# Patient Record
Sex: MALE | Race: WHITE | Employment: PART TIME | URBAN - METROPOLITAN AREA
[De-identification: names, ages, dates, MRNs, and addresses within clinical notes are randomized per-mention and may not be internally consistent; named-entity substitution may affect disease eponyms.]

---

## 2020-01-14 ENCOUNTER — HOSPITAL ENCOUNTER (OUTPATIENT)
Dept: PREADMISSION TESTING | Age: 65
Discharge: HOME | End: 2020-01-14
Payer: COMMERCIAL

## 2020-01-14 DIAGNOSIS — M20.42 HAMMER TOE OF LEFT FOOT: ICD-10-CM

## 2020-01-14 LAB
ALBUMIN SERPL-MCNC: 3.9 G/DL (ref 3.4–5)
ALBUMIN/GLOB SERPL: 1.3 {RATIO} (ref 0.8–1.7)
ALP SERPL-CCNC: 53 U/L (ref 45–117)
ALT SERPL-CCNC: 34 U/L (ref 16–61)
ANION GAP SERPL CALC-SCNC: 5 MMOL/L (ref 3–18)
AST SERPL-CCNC: 19 U/L (ref 10–38)
BILIRUB SERPL-MCNC: 1 MG/DL (ref 0.2–1)
BUN SERPL-MCNC: 13 MG/DL (ref 7–18)
BUN/CREAT SERPL: 14 (ref 12–20)
CALCIUM SERPL-MCNC: 9.2 MG/DL (ref 8.5–10.1)
CHLORIDE SERPL-SCNC: 107 MMOL/L (ref 100–111)
CO2 SERPL-SCNC: 28 MMOL/L (ref 21–32)
CREAT SERPL-MCNC: 0.93 MG/DL (ref 0.6–1.3)
ERYTHROCYTE [DISTWIDTH] IN BLOOD BY AUTOMATED COUNT: 12.8 % (ref 11.6–14.5)
GLOBULIN SER CALC-MCNC: 3 G/DL (ref 2–4)
GLUCOSE SERPL-MCNC: 95 MG/DL (ref 74–99)
HCT VFR BLD AUTO: 43.5 % (ref 36–48)
HGB BLD-MCNC: 14.3 G/DL (ref 13–16)
MCH RBC QN AUTO: 30.7 PG (ref 24–34)
MCHC RBC AUTO-ENTMCNC: 32.9 G/DL (ref 31–37)
MCV RBC AUTO: 93.3 FL (ref 74–97)
PLATELET # BLD AUTO: 246 K/UL (ref 135–420)
PMV BLD AUTO: 10.2 FL (ref 9.2–11.8)
POTASSIUM SERPL-SCNC: 4.4 MMOL/L (ref 3.5–5.5)
PROT SERPL-MCNC: 6.9 G/DL (ref 6.4–8.2)
RBC # BLD AUTO: 4.66 M/UL (ref 4.7–5.5)
SODIUM SERPL-SCNC: 140 MMOL/L (ref 136–145)
WBC # BLD AUTO: 5.8 K/UL (ref 4.6–13.2)

## 2020-01-14 PROCEDURE — 85027 COMPLETE CBC AUTOMATED: CPT

## 2020-01-14 PROCEDURE — 36415 COLL VENOUS BLD VENIPUNCTURE: CPT

## 2020-01-14 PROCEDURE — 80053 COMPREHEN METABOLIC PANEL: CPT

## 2020-02-07 ENCOUNTER — ANESTHESIA (OUTPATIENT)
Dept: SURGERY | Age: 65
End: 2020-02-07
Payer: COMMERCIAL

## 2020-02-07 ENCOUNTER — ANESTHESIA EVENT (OUTPATIENT)
Dept: SURGERY | Age: 65
End: 2020-02-07
Payer: COMMERCIAL

## 2020-02-07 ENCOUNTER — HOSPITAL ENCOUNTER (OUTPATIENT)
Age: 65
Setting detail: OUTPATIENT SURGERY
Discharge: HOME | End: 2020-02-07
Attending: ORTHOPAEDIC SURGERY | Admitting: ORTHOPAEDIC SURGERY
Payer: COMMERCIAL

## 2020-02-07 VITALS
HEIGHT: 71 IN | DIASTOLIC BLOOD PRESSURE: 77 MMHG | BODY MASS INDEX: 26.52 KG/M2 | RESPIRATION RATE: 14 BRPM | TEMPERATURE: 97 F | OXYGEN SATURATION: 99 % | SYSTOLIC BLOOD PRESSURE: 122 MMHG | HEART RATE: 62 BPM | WEIGHT: 189.44 LBS

## 2020-02-07 DIAGNOSIS — M20.5X1 CLAW TOE, ACQUIRED, RIGHT: Primary | ICD-10-CM

## 2020-02-07 PROCEDURE — 77030012508 HC MSK AIRWY LMA AMBU -A: Performed by: ANESTHESIOLOGY

## 2020-02-07 PROCEDURE — 77030002933 HC SUT MCRYL J&J -A: Performed by: ORTHOPAEDIC SURGERY

## 2020-02-07 PROCEDURE — 77030031140 HC SUT VCRL3 J&J -A: Performed by: ORTHOPAEDIC SURGERY

## 2020-02-07 PROCEDURE — 74011000250 HC RX REV CODE- 250: Performed by: ANESTHESIOLOGY

## 2020-02-07 PROCEDURE — 77030040361 HC SLV COMPR DVT MDII -B: Performed by: ORTHOPAEDIC SURGERY

## 2020-02-07 PROCEDURE — 74011250636 HC RX REV CODE- 250/636: Performed by: ORTHOPAEDIC SURGERY

## 2020-02-07 PROCEDURE — 77030002916 HC SUT ETHLN J&J -A: Performed by: ORTHOPAEDIC SURGERY

## 2020-02-07 PROCEDURE — 77030031139 HC SUT VCRL2 J&J -A: Performed by: ORTHOPAEDIC SURGERY

## 2020-02-07 PROCEDURE — 76010000138 HC OR TIME 0.5 TO 1 HR: Performed by: ORTHOPAEDIC SURGERY

## 2020-02-07 PROCEDURE — 77030000032 HC CUF TRNQT ZIMM -B: Performed by: ORTHOPAEDIC SURGERY

## 2020-02-07 PROCEDURE — 77030020782 HC GWN BAIR PAWS FLX 3M -B: Performed by: ORTHOPAEDIC SURGERY

## 2020-02-07 PROCEDURE — 74011000250 HC RX REV CODE- 250: Performed by: ORTHOPAEDIC SURGERY

## 2020-02-07 PROCEDURE — 76210000006 HC OR PH I REC 0.5 TO 1 HR: Performed by: ORTHOPAEDIC SURGERY

## 2020-02-07 PROCEDURE — 76210000020 HC REC RM PH II FIRST 0.5 HR: Performed by: ORTHOPAEDIC SURGERY

## 2020-02-07 PROCEDURE — 74011250636 HC RX REV CODE- 250/636: Performed by: ANESTHESIOLOGY

## 2020-02-07 PROCEDURE — 76060000032 HC ANESTHESIA 0.5 TO 1 HR: Performed by: ORTHOPAEDIC SURGERY

## 2020-02-07 RX ORDER — NALOXONE HYDROCHLORIDE 0.4 MG/ML
0.1 INJECTION, SOLUTION INTRAMUSCULAR; INTRAVENOUS; SUBCUTANEOUS AS NEEDED
Status: DISCONTINUED | OUTPATIENT
Start: 2020-02-07 | End: 2020-02-10 | Stop reason: HOSPADM

## 2020-02-07 RX ORDER — PROPOFOL 10 MG/ML
INJECTION, EMULSION INTRAVENOUS AS NEEDED
Status: DISCONTINUED | OUTPATIENT
Start: 2020-02-07 | End: 2020-02-07 | Stop reason: HOSPADM

## 2020-02-07 RX ORDER — INSULIN LISPRO 100 [IU]/ML
INJECTION, SOLUTION INTRAVENOUS; SUBCUTANEOUS ONCE
Status: DISCONTINUED | OUTPATIENT
Start: 2020-02-07 | End: 2020-02-08 | Stop reason: HOSPADM

## 2020-02-07 RX ORDER — LIDOCAINE HYDROCHLORIDE 20 MG/ML
INJECTION, SOLUTION EPIDURAL; INFILTRATION; INTRACAUDAL; PERINEURAL AS NEEDED
Status: DISCONTINUED | OUTPATIENT
Start: 2020-02-07 | End: 2020-02-07 | Stop reason: HOSPADM

## 2020-02-07 RX ORDER — BUPIVACAINE HYDROCHLORIDE 5 MG/ML
INJECTION, SOLUTION EPIDURAL; INTRACAUDAL AS NEEDED
Status: DISCONTINUED | OUTPATIENT
Start: 2020-02-07 | End: 2020-02-07 | Stop reason: HOSPADM

## 2020-02-07 RX ORDER — DEXAMETHASONE SODIUM PHOSPHATE 4 MG/ML
INJECTION, SOLUTION INTRA-ARTICULAR; INTRALESIONAL; INTRAMUSCULAR; INTRAVENOUS; SOFT TISSUE AS NEEDED
Status: DISCONTINUED | OUTPATIENT
Start: 2020-02-07 | End: 2020-02-07 | Stop reason: HOSPADM

## 2020-02-07 RX ORDER — HYDROMORPHONE HYDROCHLORIDE 1 MG/ML
0.2 INJECTION, SOLUTION INTRAMUSCULAR; INTRAVENOUS; SUBCUTANEOUS AS NEEDED
Status: DISCONTINUED | OUTPATIENT
Start: 2020-02-07 | End: 2020-02-10 | Stop reason: HOSPADM

## 2020-02-07 RX ORDER — ALBUTEROL SULFATE 0.83 MG/ML
2.5 SOLUTION RESPIRATORY (INHALATION) AS NEEDED
Status: DISCONTINUED | OUTPATIENT
Start: 2020-02-07 | End: 2020-02-10 | Stop reason: HOSPADM

## 2020-02-07 RX ORDER — ONDANSETRON 2 MG/ML
4 INJECTION INTRAMUSCULAR; INTRAVENOUS ONCE
Status: DISCONTINUED | OUTPATIENT
Start: 2020-02-07 | End: 2020-02-08 | Stop reason: HOSPADM

## 2020-02-07 RX ORDER — SODIUM CHLORIDE, SODIUM LACTATE, POTASSIUM CHLORIDE, CALCIUM CHLORIDE 600; 310; 30; 20 MG/100ML; MG/100ML; MG/100ML; MG/100ML
150 INJECTION, SOLUTION INTRAVENOUS CONTINUOUS
Status: DISCONTINUED | OUTPATIENT
Start: 2020-02-07 | End: 2020-02-10 | Stop reason: HOSPADM

## 2020-02-07 RX ORDER — DEXTROSE MONOHYDRATE 100 MG/ML
125-250 INJECTION, SOLUTION INTRAVENOUS AS NEEDED
Status: DISCONTINUED | OUTPATIENT
Start: 2020-02-07 | End: 2020-02-10 | Stop reason: HOSPADM

## 2020-02-07 RX ORDER — CEFAZOLIN SODIUM 2 G/50ML
2 SOLUTION INTRAVENOUS ONCE
Status: COMPLETED | OUTPATIENT
Start: 2020-02-07 | End: 2020-02-07

## 2020-02-07 RX ORDER — MIDAZOLAM HYDROCHLORIDE 1 MG/ML
INJECTION, SOLUTION INTRAMUSCULAR; INTRAVENOUS AS NEEDED
Status: DISCONTINUED | OUTPATIENT
Start: 2020-02-07 | End: 2020-02-07 | Stop reason: HOSPADM

## 2020-02-07 RX ORDER — KETOROLAC TROMETHAMINE 15 MG/ML
INJECTION, SOLUTION INTRAMUSCULAR; INTRAVENOUS AS NEEDED
Status: DISCONTINUED | OUTPATIENT
Start: 2020-02-07 | End: 2020-02-07 | Stop reason: HOSPADM

## 2020-02-07 RX ORDER — MAGNESIUM SULFATE 100 %
4 CRYSTALS MISCELLANEOUS AS NEEDED
Status: DISCONTINUED | OUTPATIENT
Start: 2020-02-07 | End: 2020-02-10 | Stop reason: HOSPADM

## 2020-02-07 RX ORDER — FENTANYL CITRATE 50 UG/ML
25 INJECTION, SOLUTION INTRAMUSCULAR; INTRAVENOUS
Status: DISCONTINUED | OUTPATIENT
Start: 2020-02-07 | End: 2020-02-10 | Stop reason: HOSPADM

## 2020-02-07 RX ORDER — FENTANYL CITRATE 50 UG/ML
INJECTION, SOLUTION INTRAMUSCULAR; INTRAVENOUS AS NEEDED
Status: DISCONTINUED | OUTPATIENT
Start: 2020-02-07 | End: 2020-02-07 | Stop reason: HOSPADM

## 2020-02-07 RX ORDER — SODIUM CHLORIDE, SODIUM LACTATE, POTASSIUM CHLORIDE, CALCIUM CHLORIDE 600; 310; 30; 20 MG/100ML; MG/100ML; MG/100ML; MG/100ML
125 INJECTION, SOLUTION INTRAVENOUS CONTINUOUS
Status: DISCONTINUED | OUTPATIENT
Start: 2020-02-07 | End: 2020-02-10 | Stop reason: HOSPADM

## 2020-02-07 RX ORDER — SODIUM CHLORIDE 0.9 % (FLUSH) 0.9 %
5-40 SYRINGE (ML) INJECTION AS NEEDED
Status: DISCONTINUED | OUTPATIENT
Start: 2020-02-07 | End: 2020-02-10 | Stop reason: HOSPADM

## 2020-02-07 RX ORDER — HYDROCODONE BITARTRATE AND ACETAMINOPHEN 5; 325 MG/1; MG/1
1 TABLET ORAL AS NEEDED
Status: DISCONTINUED | OUTPATIENT
Start: 2020-02-07 | End: 2020-02-10 | Stop reason: HOSPADM

## 2020-02-07 RX ORDER — SODIUM CHLORIDE 0.9 % (FLUSH) 0.9 %
5-40 SYRINGE (ML) INJECTION EVERY 8 HOURS
Status: DISCONTINUED | OUTPATIENT
Start: 2020-02-07 | End: 2020-02-10 | Stop reason: HOSPADM

## 2020-02-07 RX ORDER — ONDANSETRON 2 MG/ML
INJECTION INTRAMUSCULAR; INTRAVENOUS AS NEEDED
Status: DISCONTINUED | OUTPATIENT
Start: 2020-02-07 | End: 2020-02-07 | Stop reason: HOSPADM

## 2020-02-07 RX ORDER — DIPHENHYDRAMINE HYDROCHLORIDE 50 MG/ML
12.5 INJECTION, SOLUTION INTRAMUSCULAR; INTRAVENOUS
Status: DISCONTINUED | OUTPATIENT
Start: 2020-02-07 | End: 2020-02-10 | Stop reason: HOSPADM

## 2020-02-07 RX ADMIN — MIDAZOLAM 2 MG: 1 INJECTION INTRAMUSCULAR; INTRAVENOUS at 13:35

## 2020-02-07 RX ADMIN — DEXAMETHASONE SODIUM PHOSPHATE 4 MG: 4 INJECTION, SOLUTION INTRAMUSCULAR; INTRAVENOUS at 14:05

## 2020-02-07 RX ADMIN — LIDOCAINE HYDROCHLORIDE 80 MG: 20 INJECTION, SOLUTION EPIDURAL; INFILTRATION; INTRACAUDAL; PERINEURAL at 13:41

## 2020-02-07 RX ADMIN — ONDANSETRON HYDROCHLORIDE 4 MG: 2 INJECTION INTRAMUSCULAR; INTRAVENOUS at 14:05

## 2020-02-07 RX ADMIN — FENTANYL CITRATE 50 MCG: 50 INJECTION, SOLUTION INTRAMUSCULAR; INTRAVENOUS at 13:40

## 2020-02-07 RX ADMIN — FENTANYL CITRATE 50 MCG: 50 INJECTION, SOLUTION INTRAMUSCULAR; INTRAVENOUS at 14:04

## 2020-02-07 RX ADMIN — PROPOFOL 180 MG: 10 INJECTION, EMULSION INTRAVENOUS at 13:41

## 2020-02-07 RX ADMIN — KETOROLAC TROMETHAMINE 30 MG: 15 INJECTION, SOLUTION INTRAMUSCULAR; INTRAVENOUS at 14:05

## 2020-02-07 RX ADMIN — SODIUM CHLORIDE, SODIUM LACTATE, POTASSIUM CHLORIDE, AND CALCIUM CHLORIDE 125 ML/HR: 600; 310; 30; 20 INJECTION, SOLUTION INTRAVENOUS at 13:30

## 2020-02-07 RX ADMIN — CEFAZOLIN SODIUM 2 G: 2 SOLUTION INTRAVENOUS at 13:38

## 2020-02-07 NOTE — PERIOP NOTES
Intake/Output Summary (Last 24 hours) at 2/7/2020 1332  Last data filed at 2/7/2020 1300  Gross per 24 hour   Intake 1000 ml   Output --   Net 1000 ml

## 2020-02-07 NOTE — DISCHARGE INSTRUCTIONS
Resume regular diet. Weight bearing as tolerated to left foot. Keep post-operative shoe on at all times while walking. You may remove the dressing in 48 hours. Call Dr. Khanh Marquis office for any questions. DISCHARGE SUMMARY from Nurse    PATIENT INSTRUCTIONS:    After general anesthesia or intravenous sedation, for 24 hours or while taking prescription Narcotics:  · Limit your activities  · Do not drive and operate hazardous machinery  · Do not make important personal or business decisions  · Do  not drink alcoholic beverages  · If you have not urinated within 8 hours after discharge, please contact your surgeon on call. Report the following to your surgeon:  · Excessive pain, swelling, redness or odor of or around the surgical area  · Temperature over 100.5  · Nausea and vomiting lasting longer than 4 hours or if unable to take medications  · Any signs of decreased circulation or nerve impairment to extremity: change in color, persistent  numbness, tingling, coldness or increase pain  · Any questions    What to do at Home:  Recommended activity: Ambulate in house and No driving while on analgesics. If you experience any of the following symptoms fever, chills, uncontrolled pain, persistent nausea/vomiting, please follow up with Dr. Ekaterina Lal. *  Please give a list of your current medications to your Primary Care Provider. *  Please update this list whenever your medications are discontinued, doses are      changed, or new medications (including over-the-counter products) are added. *  Please carry medication information at all times in case of emergency situations. These are general instructions for a healthy lifestyle:    No smoking/ No tobacco products/ Avoid exposure to second hand smoke  Surgeon General's Warning:  Quitting smoking now greatly reduces serious risk to your health.     Obesity, smoking, and sedentary lifestyle greatly increases your risk for illness    A healthy diet, regular physical exercise & weight monitoring are important for maintaining a healthy lifestyle    You may be retaining fluid if you have a history of heart failure or if you experience any of the following symptoms:  Weight gain of 3 pounds or more overnight or 5 pounds in a week, increased swelling in our hands or feet or shortness of breath while lying flat in bed. Please call your doctor as soon as you notice any of these symptoms; do not wait until your next office visit. The discharge information has been reviewed with the patient and caregiver. The patient and caregiver verbalized understanding. Discharge medications reviewed with the patient and caregiver and appropriate educational materials and side effects teaching were provided. ___________________________________________________________________________________________________________________________________    Patient armband removed and shredded.

## 2020-02-07 NOTE — INTERVAL H&P NOTE
H&P Update:  Emilie Krishnamurthy was seen and examined. History and physical has been reviewed. The patient has been examined.  There have been no significant clinical changes since the completion of the originally dated History and Physical.

## 2020-02-07 NOTE — BRIEF OP NOTE
BRIEF OPERATIVE NOTE    Date of Procedure: 2/7/2020   Preoperative Diagnosis: CLAW TOES LEFT 2, 3, 4 TOES  Postoperative Diagnosis: CLAW TOES LEFT 2, 3, 4 TOES    Procedure(s):  LEFT PERCUTANEOUS LENGTHENING OF THE FLEXOR DIGITORUM LONGUS  Surgeon(s) and Role:     Cony Agee MD - Primary           Surgical Staff:  Circ-1: Elena Whitaker RN  Scrub Tech-1: [facility]  Surg Asst-1: Christina Davis case tracking events are documented in the log.   Anesthesia: General   Estimated Blood Loss: <10  Specimens: * No specimens in log *   Findings: tight fdl and PIP of 2-4   Complications: none  Implants: * No implants in log *

## 2020-02-07 NOTE — PERIOP NOTES
Reviewed PTA medication list with patient/caregiver and patient/caregiver denies any additional medications. Patient admits to having a responsible adult care for them for at least 24 hours after surgery.     Dual skin assessment completed by Lisa HAWTHORNE and Nick Little RN.

## 2020-02-07 NOTE — ANESTHESIA PREPROCEDURE EVALUATION
Relevant Problems   No relevant active problems       Anesthetic History               Review of Systems / Medical History  Patient summary reviewed, nursing notes reviewed and pertinent labs reviewed    Pulmonary        Sleep apnea        Comments: S/ uppp   Neuro/Psych   Within defined limits           Cardiovascular  Within defined limits                Exercise tolerance: >4 METS     GI/Hepatic/Renal  Within defined limits              Endo/Other        Arthritis     Other Findings   Comments: Has had 19 surgeries and no problems           Physical Exam    Airway  Mallampati: II  TM Distance: 4 - 6 cm  Neck ROM: normal range of motion   Mouth opening: Normal     Cardiovascular    Rhythm: regular  Rate: normal         Dental  No notable dental hx       Pulmonary  Breath sounds clear to auscultation               Abdominal  GI exam deferred       Other Findings            Anesthetic Plan    ASA: 1  Anesthesia type: general          Induction: Intravenous  Anesthetic plan and risks discussed with: Patient

## 2020-02-07 NOTE — ANESTHESIA POSTPROCEDURE EVALUATION
Procedure(s):  LEFT PERCUTANEOUS LENGTHENING OF THE FLEXOR DIGITORUM LONGUS. general    Anesthesia Post Evaluation      Multimodal analgesia: multimodal analgesia used between 6 hours prior to anesthesia start to PACU discharge  Patient location during evaluation: PACU  Patient participation: complete - patient participated  Level of consciousness: awake  Pain management: adequate  Airway patency: patent  Anesthetic complications: no  Cardiovascular status: acceptable  Respiratory status: acceptable  Hydration status: acceptable  Post anesthesia nausea and vomiting:  none      Vitals Value Taken Time   /72 2/7/2020  2:45 PM   Temp 36.1 °C (97 °F) 2/7/2020  2:14 PM   Pulse 62 2/7/2020  2:49 PM   Resp 16 2/7/2020  2:49 PM   SpO2 100 % 2/7/2020  2:49 PM   Vitals shown include unvalidated device data.

## 2020-02-08 NOTE — OP NOTES
[facility] [place]  OPERATIVE REPORT    Name:  Hunter Emery  MR#:   [id number]  :  1955  ACCOUNT #:  [de-identified]  DATE OF SERVICE:  2020    PREOPERATIVE DIAGNOSIS:  Claw toes of the left foot. POSTOPERATIVE DIAGNOSIS:  Claw toes of the left foot. PROCEDURE PERFORMED:  Left percutaneous lengthening of the flexor digitorum longus tendon of the second, third, and fourth toes. SURGEON:  Phoebe Velarde MD.    ASSISTANT:  Anabel Johnson. SCRUB:  Ana Schreiber. CIRCULATOR:  Joelle Nelson. ANESTHESIA:  General by LMA. Marcaine plain 0.5% was injected in the foot. COMPLICATIONS:  none    SPECIMENS REMOVED:  none. IMPLANTS:  none. ESTIMATED BLOOD LOSS:  Less than 10. FINDINGS:  Tight PIP joints and FDL of the second, third, and fourth toes, corn at the tip of the third toe. 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 He underwent osteotomies and tendon transfers and overall, his hindfoot has done well, but complaining of pain at the tip of his toe. He was working on the shooting range with some rotating targets, did a lot of squatting and kneeling and had a lot of pain at the tip of his third toe. He was seen in the office, found to have healed surgical scars. He had clawing of the lesser toes with a thickened hypertrophic callus at the tip of the third toe, which was shaved and gave good relief of his symptoms. We reviewed the fact that he still does have mild amount of varus at the heel as well as his previous surgeries. He is complaining only of pain at the tip of the toes. We discussed possible PIP joint resection with extensor tendon lengthening versus cutting the FDL. He would like to get rather small surgery as possible and try to continue working. PROCEDURE:  He was marked preoperatively. After marking, he was taken to the operating room.   He underwent general anesthesia by LMA. He was prepped and draped up to his ankle and after reviewing his x-rays, time-out and indications, we took 10 mL of 0.5% Marcaine plain and injected into the second, third, and fourth web spaces in a digital-type block. We had obviously reviewed the time-out prior to doing this. We then took a 71 [place] blade and then percutaneously made a vertical incision, turned 90 degrees, and then carefully with the toe extended, cut the FDL tendon to the second toe. We repeated this for the third toe and then the fourth toe. There was 1 incision at the bottom of each toe, a total of 3. They were about 3-mm long. We then manipulated the PIP joint and a felt a palpable and audible pop as the joint was straightened, consistent with a closed osteoclasis. We washed the wounds with normal saline and bacitracin. We closed the wounds with nylon. We then shaved the corn that was at the tip of the third toe and placed him in a soft dressing. He had good capillary refill. No signs of complications. He was placed in a soft dressing, into a postoperative shoe. His LMA was removed, stable to recovery room. No signs of complication. He can change the dressing in 2-3 days. He can do gentle range of motion, icing as tolerated, will be seen in the office in 2 weeks for sutures out.       MD ONELIA [email or web address]  D:  02/07/2020 14:25  T:  02/08/2020 0:22  JOB #:  [id number]

## 2020-07-22 ENCOUNTER — VIRTUAL VISIT (OUTPATIENT)
Dept: FAMILY MEDICINE CLINIC | Age: 65
End: 2020-07-22
Payer: COMMERCIAL

## 2020-07-22 VITALS
OXYGEN SATURATION: 99 % | WEIGHT: 193 LBS | HEART RATE: 33 BPM | DIASTOLIC BLOOD PRESSURE: 71 MMHG | HEIGHT: 71 IN | SYSTOLIC BLOOD PRESSURE: 114 MMHG | BODY MASS INDEX: 27.02 KG/M2

## 2020-07-22 DIAGNOSIS — Z20.822 CLOSE EXPOSURE TO COVID-19 VIRUS: ICD-10-CM

## 2020-07-22 DIAGNOSIS — J34.89 SINUS DRAINAGE: Primary | ICD-10-CM

## 2020-07-22 DIAGNOSIS — J01.01 ACUTE RECURRENT MAXILLARY SINUSITIS: ICD-10-CM

## 2020-07-22 PROBLEM — G50.0 TRIGEMINAL NEURALGIA: Status: ACTIVE | Noted: 2020-07-22

## 2020-07-22 PROBLEM — J30.9 ALLERGIC RHINITIS: Status: ACTIVE | Noted: 2020-07-22

## 2020-07-22 PROBLEM — S20.219A CONTUSION OF CHEST: Status: ACTIVE | Noted: 2020-07-22

## 2020-07-22 PROBLEM — J01.90 ACUTE SINUSITIS: Status: ACTIVE | Noted: 2020-07-22

## 2020-07-22 PROBLEM — N52.9 ERECTILE DYSFUNCTION: Status: ACTIVE | Noted: 2020-07-22

## 2020-07-22 PROBLEM — E78.5 HYPERLIPIDEMIA: Status: ACTIVE | Noted: 2020-07-22

## 2020-07-22 PROCEDURE — 99442 PR PHYS/QHP TELEPHONE EVALUATION 11-20 MIN: CPT | Performed by: FAMILY MEDICINE

## 2020-07-22 RX ORDER — CARBAMAZEPINE 200 MG/1
200 TABLET ORAL 3 TIMES DAILY
COMMUNITY
End: 2022-05-04 | Stop reason: SDUPTHER

## 2020-07-22 RX ORDER — AZELASTINE HCL 205.5 UG/1
SPRAY NASAL 2 TIMES DAILY
COMMUNITY

## 2020-07-22 RX ORDER — MELOXICAM 15 MG/1
15 TABLET ORAL DAILY
COMMUNITY
End: 2020-08-18

## 2020-07-22 RX ORDER — INDOMETHACIN 50 MG/1
CAPSULE ORAL 3 TIMES DAILY
COMMUNITY

## 2020-07-22 RX ORDER — AMOXICILLIN AND CLAVULANATE POTASSIUM 562.5; 437.5; 62.5 MG/1; MG/1; MG/1
1 TABLET, FILM COATED, EXTENDED RELEASE ORAL 2 TIMES DAILY
Qty: 20 TAB | Refills: 0 | Status: SHIPPED | OUTPATIENT
Start: 2020-07-22 | End: 2020-08-01

## 2020-07-22 RX ORDER — SILDENAFIL 100 MG/1
100 TABLET, FILM COATED ORAL AS NEEDED
COMMUNITY
End: 2021-04-02 | Stop reason: SDUPTHER

## 2020-07-22 RX ORDER — ATORVASTATIN CALCIUM 20 MG/1
TABLET, FILM COATED ORAL DAILY
COMMUNITY
End: 2021-09-19

## 2020-07-22 RX ORDER — CARBAMAZEPINE 100 MG/1
TABLET, EXTENDED RELEASE ORAL 2 TIMES DAILY
COMMUNITY
End: 2022-05-04 | Stop reason: SDUPTHER

## 2020-07-22 RX ORDER — BUPIVACAINE HYDROCHLORIDE 5 MG/ML
INJECTION, SOLUTION EPIDURAL; INTRACAUDAL ONCE
COMMUNITY

## 2020-07-22 RX ORDER — EPINEPHRINE 0.3 MG/.3ML
0.3 INJECTION SUBCUTANEOUS
COMMUNITY

## 2020-07-22 NOTE — PROGRESS NOTES
Mili Guzman is a 59 y.o. male, evaluated via audio-only technology on 7/22/2020 for No chief complaint on file. .    Assessment & Plan:    sinusitis, exposure to COVID-19 I do think the patient has had chronic recurrent sinusitis. He is going to see the ear nose and throat doctors in several days. He was working and had some exposure may be minimally to someone positive for COVID-19 he is going to be tested in 2 days. He has been socially distance. I am going to cover with antibiotics for the sinusitis I recommended warm steam inhalation will follow-up if any other issues arise. We will get an opinion from ear nose and throat as well. The patient was at his home during the evaluation I was at my office. It was a 15-minute visit. 12  Subjective:       Prior to Admission medications    Medication Sig Start Date End Date Taking? Authorizing Provider   atorvastatin (LIPITOR) 20 mg tablet Take  by mouth daily. Provider, Historical   azelastine (ASTEPRO) 0.15 % (205.5 mcg) two (2) times a day. Provider, Historical   bupivacaine, PF, (MARCAINE) 0.5 % (5 mg/mL) injection once. Provider, Historical   carBAMazepine XR (TEGretol XR) 100 mg SR tablet Take  by mouth two (2) times a day. Provider, Historical   EPINEPHrine (EpiPen) 0.3 mg/0.3 mL injection 0.3 mg by IntraMUSCular route once as needed for Allergic Response. Provider, Historical   carBAMazepine (Epitol) 200 mg tablet Take 200 mg by mouth three (3) times daily. Provider, Historical   indomethacin (INDOCIN) 50 mg capsule Take  by mouth three (3) times daily. Provider, Historical   sildenafil citrate (VIAGRA) 100 mg tablet Take 100 mg by mouth as needed for Erectile Dysfunction. Provider, Historical   meloxicam (MOBIC) 15 mg tablet Take 15 mg by mouth daily. Provider, Historical   lysine (L-LYSINE) 500 mg tab tablet Take 1,000 mg by mouth daily.     Provider, Historical   ascorbic acid, vitamin C, (VITAMIN C) 500 mg tablet Take 500 mg by mouth daily. Provider, Historical   loperamide (IMODIUM A-D) 2 mg tablet Take 4 mg by mouth daily. Provider, Historical         Review of Systems   Constitutional: Positive for malaise/fatigue. HENT: Positive for congestion and sinus pain. Eyes: Negative. Respiratory: Negative. Cardiovascular: Negative. Gastrointestinal: Negative. Genitourinary: Negative. Musculoskeletal: Positive for back pain and joint pain. Skin: Negative. Neurological: Negative. Psychiatric/Behavioral: Negative. Physical exam the patient has no swelling or tenderness in any of his legs. He has tenderness when he palpates over his face. His blood pressure 118/80 his pulse is 84 respirations are normal.  There is no tenderness in the abdomen he does not feel any fullness in his neck he has a little anxiety about the COVID-19 exposure he has been socially distancing and staying away from his mother rashes. No edema. He is oriented and alert he is pleasant in no distress. No flowsheet data found. Manju Lamb, who was evaluated through a patient-initiated, synchronous (real-time) audio only encounter, and/or her healthcare decision maker, is aware that it is a billable service, with coverage as determined by his insurance carrier. He provided verbal consent to proceed: n/a- consent obtained within past 12 months. He has not had a related appointment within my department in the past 7 days or scheduled within the next 24 hours.       Total Time: minutes: 11-20 minutes    Chaparrita Olmstead MD

## 2020-08-18 RX ORDER — MELOXICAM 15 MG/1
TABLET ORAL
Qty: 30 TAB | Refills: 0 | Status: SHIPPED | OUTPATIENT
Start: 2020-08-18 | End: 2020-10-09 | Stop reason: SDUPTHER

## 2020-10-02 RX ORDER — FAMCICLOVIR 500 MG/1
TABLET ORAL
Qty: 60 TAB | Refills: 0 | Status: SHIPPED | OUTPATIENT
Start: 2020-10-02 | End: 2021-05-07 | Stop reason: SDUPTHER

## 2020-10-09 ENCOUNTER — TELEPHONE (OUTPATIENT)
Dept: FAMILY MEDICINE CLINIC | Age: 65
End: 2020-10-09

## 2020-10-09 DIAGNOSIS — M15.9 PRIMARY OSTEOARTHRITIS INVOLVING MULTIPLE JOINTS: Primary | ICD-10-CM

## 2020-10-09 RX ORDER — MELOXICAM 15 MG/1
15 TABLET ORAL DAILY
Qty: 90 TAB | Refills: 1 | Status: SHIPPED | OUTPATIENT
Start: 2020-10-09

## 2021-04-06 RX ORDER — SILDENAFIL 100 MG/1
100 TABLET, FILM COATED ORAL AS NEEDED
Qty: 30 TAB | Refills: 0 | Status: SHIPPED | OUTPATIENT
Start: 2021-04-06 | End: 2021-09-03

## 2021-05-07 DIAGNOSIS — G50.0 TRIGEMINAL NEURALGIA: Primary | ICD-10-CM

## 2021-05-07 RX ORDER — FAMCICLOVIR 500 MG/1
TABLET ORAL
Qty: 60 TAB | Refills: 0 | Status: SHIPPED | OUTPATIENT
Start: 2021-05-07 | End: 2022-05-04 | Stop reason: SDUPTHER

## 2021-09-03 RX ORDER — SILDENAFIL 100 MG/1
TABLET, FILM COATED ORAL
Qty: 2 TABLET | Refills: 0 | Status: SHIPPED | OUTPATIENT
Start: 2021-09-03 | End: 2022-05-04 | Stop reason: SDUPTHER

## 2021-09-14 ENCOUNTER — OFFICE VISIT (OUTPATIENT)
Dept: FAMILY MEDICINE CLINIC | Age: 66
End: 2021-09-14
Payer: MEDICARE

## 2021-09-14 VITALS — DIASTOLIC BLOOD PRESSURE: 69 MMHG | SYSTOLIC BLOOD PRESSURE: 113 MMHG | OXYGEN SATURATION: 96 % | HEART RATE: 68 BPM

## 2021-09-14 DIAGNOSIS — E78.01 FAMILIAL HYPERCHOLESTEROLEMIA: ICD-10-CM

## 2021-09-14 DIAGNOSIS — G50.0 TRIGEMINAL NEURALGIA: ICD-10-CM

## 2021-09-14 DIAGNOSIS — Z12.5 SCREENING FOR PROSTATE CANCER: ICD-10-CM

## 2021-09-14 DIAGNOSIS — Z00.00 ENCOUNTER FOR PREVENTATIVE ADULT HEALTH CARE EXAMINATION: Primary | ICD-10-CM

## 2021-09-14 DIAGNOSIS — N52.9 ERECTILE DYSFUNCTION, UNSPECIFIED ERECTILE DYSFUNCTION TYPE: ICD-10-CM

## 2021-09-14 PROBLEM — N40.0 PROSTATE ENLARGEMENT: Status: RESOLVED | Noted: 2021-09-14 | Resolved: 2021-09-14

## 2021-09-14 PROBLEM — N40.0 PROSTATE ENLARGEMENT: Status: ACTIVE | Noted: 2021-09-14

## 2021-09-14 PROCEDURE — G8421 BMI NOT CALCULATED: HCPCS | Performed by: FAMILY MEDICINE

## 2021-09-14 PROCEDURE — G0438 PPPS, INITIAL VISIT: HCPCS | Performed by: FAMILY MEDICINE

## 2021-09-14 PROCEDURE — 3017F COLORECTAL CA SCREEN DOC REV: CPT | Performed by: FAMILY MEDICINE

## 2021-09-14 PROCEDURE — G8510 SCR DEP NEG, NO PLAN REQD: HCPCS | Performed by: FAMILY MEDICINE

## 2021-09-14 PROCEDURE — 1101F PT FALLS ASSESS-DOCD LE1/YR: CPT | Performed by: FAMILY MEDICINE

## 2021-09-14 RX ORDER — SILDENAFIL 100 MG/1
100 TABLET, FILM COATED ORAL AS NEEDED
Qty: 8 TABLET | Refills: 5 | Status: SHIPPED | OUTPATIENT
Start: 2021-09-14

## 2021-09-14 NOTE — PROGRESS NOTES
Subjective:   Shante Whatley is a 72 y.o. male who was seen for Annual Wellness Visit (wellness physical )    HPI the patient is a 27-year-old male who is seen for wellness exam.  He has just got . He has had no falls or injuries no rash no syncope or loss of consciousness. Bowel movements have been appropriate. Eating relatively well. No rash no syncope or loss of consciousness. He is sexually active he uses sildenafil. He has had a colonoscopy that is appropriate. He has had no fever or chills no chest pain. Eating relatively well. No urinary tract symptomatology. He has been seen by back doctor he has bilateral sciatic type symptoms the left is greater than the right he is scheduled for an MRI I am trying to reach the patient to see if he indeed has had a colonoscopy he uses Medicare red for Tegretol whenever he has facial discomfort. He has hyperlipidemia as well. Home Medications    Medication Sig Start Date End Date Taking? Authorizing Provider   sildenafil citrate (VIAGRA) 100 mg tablet Take 1 Tablet by mouth as needed for Erectile Dysfunction. 9/14/21  Yes Sarah Lobo MD   sildenafil citrate (VIAGRA) 100 mg tablet Take 1 tablet by mouth once daily for 30 days 9/3/21  Yes Sarah Lobo MD   carBAMazepine XR (TEGretol XR) 100 mg SR tablet Take  by mouth two (2) times a day. Yes Provider, Historical   ascorbic acid, vitamin C, (VITAMIN C) 500 mg tablet Take 500 mg by mouth daily. Yes Provider, Historical   famciclovir (FAMVIR) 500 mg tablet TAKE 1 TABLET BY MOUTH THREE TIMES DAILY  Patient not taking: Reported on 9/14/2021 5/7/21   Sarah Lobo MD   meloxicam (MOBIC) 15 mg tablet Take 1 Tab by mouth daily. Patient not taking: Reported on 9/14/2021 10/9/20   Sarah Lobo MD   atorvastatin (LIPITOR) 20 mg tablet Take  by mouth daily.   Patient not taking: Reported on 9/14/2021    Provider, Historical   azelastine (ASTEPRO) 0.15 % (205.5 mcg) two (2) times a day.  Patient not taking: Reported on 9/14/2021    Provider, Historical   bupivacaine, PF, (MARCAINE) 0.5 % (5 mg/mL) injection once. Patient not taking: Reported on 9/14/2021    Provider, Historical   EPINEPHrine (EpiPen) 0.3 mg/0.3 mL injection 0.3 mg by IntraMUSCular route once as needed for Allergic Response. Patient not taking: Reported on 9/14/2021    Provider, Historical   carBAMazepine (Epitol) 200 mg tablet Take 200 mg by mouth three (3) times daily. Patient not taking: Reported on 9/14/2021    Provider, Historical   indomethacin (INDOCIN) 50 mg capsule Take  by mouth three (3) times daily. Patient not taking: Reported on 9/14/2021    Provider, Historical   lysine (L-LYSINE) 500 mg tab tablet Take 1,000 mg by mouth daily. Patient not taking: Reported on 9/14/2021    Provider, Historical   loperamide (IMODIUM A-D) 2 mg tablet Take 4 mg by mouth daily. Patient not taking: Reported on 9/14/2021    Provider, Historical      No Known Allergies  Social History     Tobacco Use    Smoking status: Former Smoker    Smokeless tobacco: Former User   Substance Use Topics    Alcohol use: Yes     Alcohol/week: 12.0 standard drinks     Types: 12 Cans of beer per week    Drug use: Never            Review of Systems   Constitutional: Negative. HENT: Negative. Eyes: Negative. Respiratory: Negative. Cardiovascular: Negative. Gastrointestinal: Negative. Endocrine: Negative. Genitourinary: Negative. Musculoskeletal: Positive for back pain. Skin: Negative. Allergic/Immunologic: Negative. Neurological: Negative. Hematological: Negative. Psychiatric/Behavioral: Negative.          Physical Exam   Objective:     Visit Vitals  /69   Pulse 68   SpO2 96%      General: alert, cooperative, no distress   Mental  status: normal mood, behavior, speech, dress, motor activity, and thought processes, able to follow commands   HENT: NCAT   Neck: no visualized mass   Resp: no respiratory distress   Neuro: no gross deficits   Skin: no discoloration or lesions of concern on visible areas   Psychiatric: normal affect, consistent with stated mood, no evidence of hallucinations   Afebrile. Vital signs are stable. The pupils are equal and reactive. The chest is clear the cardiovascular exam showed a regular rate and rhythm the abdomen is benign the extremities are clear pleasant and cooperative in no significant distress he has no straight leg raising that I can appreciate. He has some generalized arthritis    Assessment & Plan:     Adult health exam, possible lumbar disc disease, hyperlipidemia, erectile dysfunction: The patient is stable at this point. I am going to see if he has had a colonoscopy he is not here at this time I ordered a urinalysis negative for blood negative for protein negative for ketones. We will follow-up if any other issues arise. I am going to order blood work as well and I will call him with the results        35 [phone] 43    Additional exam findings: We discussed the expected course, resolution and complications of the diagnosis(es) in detail. Medication risks, benefits, costs, interactions, and alternatives were discussed as indicated. I advised him to contact the office if his condition worsens, changes or fails to improve as anticipated. He expressed understanding with the diagnosis(es) and plan.

## 2021-09-14 NOTE — PROGRESS NOTES
Esperanza Gómez presents today for   Chief Complaint   Patient presents with    Annual Wellness Visit     wellness physical        Is someone accompanying this pt? No      Is the patient using any DME equipment during OV? No      Depression Screening:  3 most recent PHQ Screens 9/14/2021   Little interest or pleasure in doing things Not at all   Feeling down, depressed, irritable, or hopeless Not at all   Total Score PHQ 2 0       Learning Assessment:  No flowsheet data found. Fall Risk  Fall Risk Assessment, last 12 mths 9/14/2021   Able to walk? Yes   Fall in past 12 months? 0   Do you feel unsteady? 0   Are you worried about falling 0       ADL  No flowsheet data found. Travel Screening:    Travel Screening     Question   Response    In the last month, have you been in contact with someone who was confirmed or suspected to have Coronavirus / COVID-19? No / Unsure    Have you had a COVID-19 viral test in the last 14 days? No    Do you have any of the following new or worsening symptoms? None of these    Have you traveled internationally or domestically in the last month? No      Travel History   Travel since 08/14/21     No documented travel since 08/14/21          Health Maintenance reviewed and discussed and ordered per Provider. Health Maintenance Due   Topic Date Due    Hepatitis C Screening  Never done    Lipid Screen  Never done    COVID-19 Vaccine (1) Never done    Colorectal Cancer Screening Combo  Never done    Shingrix Vaccine Age 50> (1 of 2) Never done    Pneumococcal 65+ years (2 of 2 - PPSV23) 12/19/2020    Flu Vaccine (1) 09/01/2021    Medicare Yearly Exam  09/09/2021   . Coordination of Care:  1. Have you been to the ER, urgent care clinic since your last visit? Hospitalized since your last visit? No      2. Have you seen or consulted any other health care providers outside of the [address] [place] [address] since your last visit?  Include any pap smears or colon screening.  Yes

## 2021-09-15 ENCOUNTER — HOSPITAL ENCOUNTER (OUTPATIENT)
Dept: LAB | Age: 66
Discharge: HOME | End: 2021-09-15
Payer: MEDICARE

## 2021-09-15 LAB
ALBUMIN SERPL-MCNC: 3.9 G/DL (ref 3.5–4.7)
ALBUMIN/GLOB SERPL: 1.3 {RATIO}
ALP SERPL-CCNC: 44 U/L (ref 38–126)
ALT SERPL-CCNC: 21 U/L (ref 3–72)
ANION GAP SERPL CALC-SCNC: 11 MMOL/L
AST SERPL W P-5'-P-CCNC: 17 U/L (ref 17–74)
BILIRUB SERPL-MCNC: 0.6 MG/DL (ref 0.2–1)
BUN SERPL-MCNC: 16 MG/DL (ref 9–21)
BUN/CREAT SERPL: 16
CA-I BLD-MCNC: 9.6 MG/DL (ref 8.5–10.5)
CHLORIDE SERPL-SCNC: 103 MMOL/L (ref 94–111)
CHOLEST SERPL-MCNC: 246 MG/DL
CO2 SERPL-SCNC: 27 MMOL/L (ref 21–33)
CREAT SERPL-MCNC: 1 MG/DL (ref 0.8–1.5)
GLOBULIN SER CALC-MCNC: 2.9 G/DL
GLUCOSE SERPL-MCNC: 95 MG/DL (ref 70–110)
HDLC SERPL-MCNC: 59 MG/DL (ref 40–60)
HDLC SERPL: 4.2 {RATIO} (ref 0–5)
LDLC SERPL CALC-MCNC: 167.4 MG/DL (ref 0–100)
LIPID PROFILE,FLP: ABNORMAL
POTASSIUM SERPL-SCNC: 3.9 MMOL/L (ref 3.2–5.1)
PROT SERPL-MCNC: 6.8 G/DL (ref 6.1–8.4)
PSA SERPL-MCNC: 1.5 NG/ML (ref 0–4)
SODIUM SERPL-SCNC: 141 MMOL/L (ref 135–145)
TRIGL SERPL-MCNC: 98 MG/DL (ref ?–150)
VLDLC SERPL CALC-MCNC: 19.6 MG/DL

## 2021-09-15 PROCEDURE — 80061 LIPID PANEL: CPT

## 2021-09-15 PROCEDURE — 80053 COMPREHEN METABOLIC PANEL: CPT

## 2021-09-15 PROCEDURE — 36415 COLL VENOUS BLD VENIPUNCTURE: CPT

## 2021-09-15 PROCEDURE — 84153 ASSAY OF PSA TOTAL: CPT

## 2021-09-16 ENCOUNTER — TRANSCRIBE ORDER (OUTPATIENT)
Dept: SCHEDULING | Age: 66
End: 2021-09-16

## 2021-09-16 DIAGNOSIS — M51.16 INTERVERTEBRAL DISC DISORDERS WITH RADICULOPATHY, LUMBAR REGION: Primary | ICD-10-CM

## 2021-09-19 ENCOUNTER — TELEPHONE (OUTPATIENT)
Dept: FAMILY MEDICINE CLINIC | Age: 66
End: 2021-09-19

## 2021-09-19 DIAGNOSIS — E78.01 FAMILIAL HYPERCHOLESTEROLEMIA: Primary | ICD-10-CM

## 2021-09-19 RX ORDER — ATORVASTATIN CALCIUM 20 MG/1
20 TABLET, FILM COATED ORAL DAILY
Qty: 30 TABLET | Refills: 1 | Status: SHIPPED | OUTPATIENT
Start: 2021-09-19

## 2021-09-19 NOTE — TELEPHONE ENCOUNTER
I called the patient he is stopped taking his cholesterol medicine were going to restart atorvastatin 20 and follow-up in 6 weeks

## 2021-10-14 PROBLEM — Z00.00 ENCOUNTER FOR PREVENTATIVE ADULT HEALTH CARE EXAMINATION: Status: RESOLVED | Noted: 2021-09-14 | Resolved: 2021-10-14

## 2021-10-29 ENCOUNTER — HOSPITAL ENCOUNTER (OUTPATIENT)
Dept: MRI IMAGING | Age: 66
Discharge: HOME | End: 2021-10-29
Payer: MEDICARE

## 2021-10-29 DIAGNOSIS — M51.16 INTERVERTEBRAL DISC DISORDERS WITH RADICULOPATHY, LUMBAR REGION: ICD-10-CM

## 2021-10-29 PROCEDURE — 72148 MRI LUMBAR SPINE W/O DYE: CPT

## 2021-11-01 ENCOUNTER — HOSPITAL ENCOUNTER (OUTPATIENT)
Dept: GENERAL RADIOLOGY | Age: 66
Discharge: HOME | End: 2021-11-01
Payer: MEDICARE

## 2021-11-01 ENCOUNTER — TRANSCRIBE ORDER (OUTPATIENT)
Dept: REGISTRATION | Age: 66
End: 2021-11-01

## 2021-11-01 DIAGNOSIS — M51.16 LUMBAR DISC PROLAPSE WITH ROOT COMPRESSION: Primary | ICD-10-CM

## 2021-11-01 DIAGNOSIS — M51.16 LUMBAR DISC PROLAPSE WITH ROOT COMPRESSION: ICD-10-CM

## 2021-11-01 PROCEDURE — 72114 X-RAY EXAM L-S SPINE BENDING: CPT

## 2022-03-01 ENCOUNTER — OFFICE VISIT (OUTPATIENT)
Dept: ORTHOPEDIC SURGERY | Age: 67
End: 2022-03-01
Payer: MEDICARE

## 2022-03-01 VITALS — BODY MASS INDEX: 26.32 KG/M2 | HEIGHT: 71 IN | WEIGHT: 188 LBS

## 2022-03-01 DIAGNOSIS — M70.62 TROCHANTERIC BURSITIS OF BOTH HIPS: ICD-10-CM

## 2022-03-01 DIAGNOSIS — M25.551 BILATERAL HIP PAIN: Primary | ICD-10-CM

## 2022-03-01 DIAGNOSIS — M70.61 TROCHANTERIC BURSITIS OF BOTH HIPS: ICD-10-CM

## 2022-03-01 DIAGNOSIS — M25.552 BILATERAL HIP PAIN: Primary | ICD-10-CM

## 2022-03-01 PROCEDURE — 20611 DRAIN/INJ JOINT/BURSA W/US: CPT | Performed by: ORTHOPAEDIC SURGERY

## 2022-03-01 PROCEDURE — 3017F COLORECTAL CA SCREEN DOC REV: CPT | Performed by: ORTHOPAEDIC SURGERY

## 2022-03-01 PROCEDURE — G8536 NO DOC ELDER MAL SCRN: HCPCS | Performed by: ORTHOPAEDIC SURGERY

## 2022-03-01 PROCEDURE — 99203 OFFICE O/P NEW LOW 30 MIN: CPT | Performed by: ORTHOPAEDIC SURGERY

## 2022-03-01 PROCEDURE — G8510 SCR DEP NEG, NO PLAN REQD: HCPCS | Performed by: ORTHOPAEDIC SURGERY

## 2022-03-01 PROCEDURE — 1101F PT FALLS ASSESS-DOCD LE1/YR: CPT | Performed by: ORTHOPAEDIC SURGERY

## 2022-03-01 PROCEDURE — G8419 CALC BMI OUT NRM PARAM NOF/U: HCPCS | Performed by: ORTHOPAEDIC SURGERY

## 2022-03-01 PROCEDURE — G8427 DOCREV CUR MEDS BY ELIG CLIN: HCPCS | Performed by: ORTHOPAEDIC SURGERY

## 2022-03-01 RX ORDER — LIDOCAINE HYDROCHLORIDE 10 MG/ML
9 INJECTION INFILTRATION; PERINEURAL ONCE
Status: COMPLETED | OUTPATIENT
Start: 2022-03-01 | End: 2022-03-01

## 2022-03-01 RX ORDER — TRIAMCINOLONE ACETONIDE 40 MG/ML
40 INJECTION, SUSPENSION INTRA-ARTICULAR; INTRAMUSCULAR ONCE
Status: COMPLETED | OUTPATIENT
Start: 2022-03-01 | End: 2022-03-01

## 2022-03-01 RX ORDER — METHYLPREDNISOLONE 4 MG/1
TABLET ORAL
Qty: 1 DOSE PACK | Refills: 0 | Status: SHIPPED | OUTPATIENT
Start: 2022-03-01 | End: 2022-05-25 | Stop reason: ALTCHOICE

## 2022-03-01 RX ADMIN — TRIAMCINOLONE ACETONIDE 40 MG: 40 INJECTION, SUSPENSION INTRA-ARTICULAR; INTRAMUSCULAR at 09:00

## 2022-03-01 RX ADMIN — LIDOCAINE HYDROCHLORIDE 9 ML: 10 INJECTION INFILTRATION; PERINEURAL at 09:00

## 2022-03-01 NOTE — PROGRESS NOTES
Name: Arpan Issa    : 1955     Service Dept: [address] [facility]    Chief Complaint   Patient presents with    Hip Pain        Visit Vitals  Ht 5' 11\" (1.803 m)   Wt 188 lb (85.3 kg)   BMI 26.22 kg/m²        No Known Allergies     Current Outpatient Medications   Medication Sig Dispense Refill    methylPREDNISolone (MEDROL DOSEPACK) 4 mg tablet Per dose pack instructions 1 Dose Pack 0    atorvastatin (LIPITOR) 20 mg tablet Take 1 Tablet by mouth daily. 30 Tablet 1    sildenafil citrate (VIAGRA) 100 mg tablet Take 1 Tablet by mouth as needed for Erectile Dysfunction. 8 Tablet 5    sildenafil citrate (VIAGRA) 100 mg tablet Take 1 tablet by mouth once daily for 30 days 2 Tablet 0    famciclovir (FAMVIR) 500 mg tablet TAKE 1 TABLET BY MOUTH THREE TIMES DAILY 60 Tab 0    meloxicam (MOBIC) 15 mg tablet Take 1 Tab by mouth daily. 90 Tab 1    azelastine (ASTEPRO) 0.15 % (205.5 mcg) two (2) times a day.  bupivacaine, PF, (MARCAINE) 0.5 % (5 mg/mL) injection once.  carBAMazepine XR (TEGretol XR) 100 mg SR tablet Take  by mouth two (2) times a day.  EPINEPHrine (EpiPen) 0.3 mg/0.3 mL injection 0.3 mg by IntraMUSCular route once as needed for Allergic Response.  carBAMazepine (Epitol) 200 mg tablet Take 200 mg by mouth three (3) times daily.  indomethacin (INDOCIN) 50 mg capsule Take  by mouth three (3) times daily.  lysine (L-LYSINE) 500 mg tab tablet Take 1,000 mg by mouth daily.  ascorbic acid, vitamin C, (VITAMIN C) 500 mg tablet Take 500 mg by mouth daily.  loperamide (IMODIUM A-D) 2 mg tablet Take 4 mg by mouth daily.        Current Facility-Administered Medications   Medication Dose Route Frequency Provider Last Rate Last Admin    [COMPLETED] lidocaine (XYLOCAINE) 10 mg/mL (1 %) injection 9 mL  9 mL Other ONCE Jayce Peace MD   9 mL at 22 0900    [COMPLETED] triamcinolone acetonide (KENALOG-40) 40 mg/mL injection 40 mg  40 mg Intra artICUlar ONCE Jayce Peace MD   40 mg at 03/01/22 0900    [COMPLETED] lidocaine (XYLOCAINE) 10 mg/mL (1 %) injection 9 mL  9 mL Other ONCE Jayce Peace MD   9 mL at 03/01/22 0900    [COMPLETED] triamcinolone acetonide (KENALOG-40) 40 mg/mL injection 40 mg  40 mg Intra artICUlar ONCE Jayce Peace MD   40 mg at 03/01/22 0900      Patient Active Problem List   Diagnosis Code    Acute sinusitis J01.90    Allergic rhinitis J30.9    Contusion of chest S20.219A    Hyperlipidemia E78.5    Erectile dysfunction N52.9    Trigeminal neuralgia G50.0    Close exposure to COVID-19 virus Z20.822      History reviewed. No pertinent family history. Social History     Socioeconomic History    Marital status:    Tobacco Use    Smoking status: Former Smoker    Smokeless tobacco: Former User   Substance and Sexual Activity    Alcohol use:  Yes     Alcohol/week: 12.0 standard drinks     Types: 12 Cans of beer per week    Drug use: Never      Past Surgical History:   Procedure Laterality Date    HX ACL RECONSTRUCTION  2000    right knee     HX HEENT  1996    UPPP    HX HEENT      sinus surgery     HX HERNIA REPAIR      umbilical    HX ORTHOPAEDIC  09/1964    left leg tendon lengthing     HX ORTHOPAEDIC  1979    right ankle     HX ORTHOPAEDIC      left shoulder rotator cuff x 2     HX ORTHOPAEDIC      right shoulder x 2     HX ORTHOPAEDIC      right elbow tendon repair     HX ORTHOPAEDIC  2017    right ankle     HX TONSILLECTOMY  1996      Past Medical History:   Diagnosis Date    Acute sinusitis 7/22/2020    Allergic rhinitis 7/22/2020    Arthritis     Cancer ([address]     skin    Contusion of chest 7/22/2020    Erectile dysfunction 7/22/2020    Hyperlipidemia 7/22/2020    Sleep apnea     does have to use cpap    Trigeminal neuralgia 7/22/2020        I have reviewed and agree with PFSH and ROS and intake form in chart and the record furthermore I have reviewed prior medical record(s) regarding this patients care during this appointment. Review of Systems:   Patient is a pleasant appearing individual, appropriately dressed, well hydrated, well nourished, who is alert, appropriately oriented for age, and in no acute distress with a normal gait and normal affect who does not appear to be in any significant pain. Physical Exam:  Right Hip - Slight decrease range of motion, No crepitation, Grossly neurovascularly intact, Good cap refill, No skin lesion, mild swelling, No gross instability, Some weakness, No groin pain, Point tenderness on the greater trochanter. Left Hip - Slight decrease range of motion, No crepitation, Grossly neurovascularly intact, Good cap refill, No skin lesion, mild swelling, No gross instability, Some weakness, No groin pain, Point tenderness on the greater trochanter. Procedure Documentation:    I discussed in detail the risks, benefits and complications of an injection which included but are not limited to infection, skin reactions, hot swollen joint, and anaphylaxis with the patient. The patient verbalized understanding and gave informed consent for the injection. The patient's bilateral hip(s) was prepped using sterile alcohol solution. A sterile needle was inserted into the bilateral hip(s) and the mixture of 9 mL Lidocaine 1%, 1 mL Kenalog 40 mg was injected under sterile technique. The needle was withdrawn and the puncture site sealed with a Band-Aid. Technique: Under sterile conditions a Identified ultrasound unit with a variable frequency (7.0-14.0 MHz) linear transducer was used to localize the placement of needle into the bilateral hip(s). Findings: Successful needle placement for hip injections. Final images were taken and saved for permanent record. The patient tolerated the injection well. The patient was instructed to call the office immediately if there is any pain, redness, warmth, fever, or chills.    Encounter Diagnoses     ICD-10-CM ICD-9-CM   1. Bilateral hip pain  M25.551 719.45    M25.552    2. Trochanteric bursitis of both hips  M70.61 726.5    M70.62        HPI:  The patient is here with a chief complaint of low back pain and bilateral hip pain, throbbing, burning pain. He fell and has been having difficulty since. Pain is 5/10. He had an MRI, which shows he does have a small little herniated disk and hip bursitis on exam.    Assessment/Plan:  1. Bilateral hip bursitis. Plan will be for activities as tolerated started, no restrictions. We will give him a cortisone injection today to the bilateral hips and go from there. As part of continued conservative pain management options the patient was advised to utilize Tylenol or OTC NSAIDS as long as it is not medically contraindicated. Return to Office: Follow-up and Dispositions    · Return in about 4 weeks (around 3/29/2022). Administrations This Visit     lidocaine (XYLOCAINE) 10 mg/mL (1 %) injection 9 mL     Admin Date  03/01/2022 Action  Given Dose  9 mL Route  Other Administered By  Stuart Shea LPN    Admin Date  76/37/4006 Action  Given Dose  9 mL Route  Other Administered By  Stuart Shea LPN          triamcinolone acetonide (KENALOG-40) 40 mg/mL injection 40 mg     Admin Date  03/01/2022 Action  Given Dose  40 mg Route  Intra artICUlar Administered By  Stuart Shea LPN    Admin Date  07/06/5016 Action  Given Dose  40 mg Route  Intra artICUlar Administered By  Stuart Shea LPN               Scribed by Yuli Trevino LPN as dictated by [facility] VEENA Torre MD.  Documentation True and Accepted Jayce Torre MD

## 2022-03-01 NOTE — LETTER
Bhargav Smith   1955   [id number]       3/1/2022       I hereby authorize and direct Jayce Ríos MD, Rodo Michele, and whomever he may designate as his associate to perform upon myself the following procedure:    Injection of: Kenalog, BL HIP     If any unforeseen condition arises in the course of the procedure, I further authorize him and his associated and/or assistant(s) to do whatever he/she deems advisable. The nature, purpose, benefits, risks, side effects, likelihood of achieving goals, and potential problems that might occur during recuperation, risks for not receiving the proposed care, treatment and services and alternatives of the procedure have been fully explained to me by my physician including, but not limited to:    Swelling, joint pain, skin pigment changes, worsening of condition, and failure to improve. I acknowledge that no guarantee or assurance has been made to me as to the results that may be obtained or the likelihood of success.                 _______________________________________     Signature of patient or authorized representative                [employer] [facility] fax: [phone]

## 2022-03-01 NOTE — Clinical Note
3/2/2022    Patient: John Elizabeth   YOB: 1955   Date of Visit: 3/1/2022     Jose Florian MD  Via     Dear Jose Florian MD,      Thank you for referring Mr. Georgina Campbell to [address] [place] [address] for evaluation. My notes for this consultation are attached. If you have questions, please do not hesitate to call me. I look forward to following your patient along with you.       Sincerely,    Dary Markham MD

## 2022-03-01 NOTE — PATIENT INSTRUCTIONS
Hip Bursitis: Care Instructions  Your Care Instructions     Bursitis is inflammation of the bursa. A bursa is a small sac of fluid that cushions a joint and helps it move easily. A bursa sits between a bone in the hip and the muscles and tendons in the thigh and buttock. Injury or overuse of the hip can cause bursitis. Activities that can lead to bursitis include twisting and rapid joint movement. Bursitis can cause hip pain. Bursitis usually gets better if you avoid the activity that caused it. If pain lasts or gets worse despite home treatment, your doctor may draw fluid from the bursa through a needle. This may relieve your pain and help your doctor know if you have an infection. If so, your doctor will prescribe antibiotics. If you have inflammation only, you may get a corticosteroid shot to reduce swelling and pain. Sometimes surgery is needed to drain or remove the bursa. Follow-up care is a key part of your treatment and safety. Be sure to make and go to all appointments, and call your doctor if you are having problems. It's also a good idea to know your test results and keep a list of the medicines you take. How can you care for yourself at home? · Put ice or a cold pack on your hip for 10 to 20 minutes at a time. Put a thin cloth between the ice and your skin. · After 3 days of using ice, you may use heat on your hip. You can use a hot water bottle, a heating pad set on low, or a warm, moist towel. · Rest your hip. Stop any activities that cause pain. Switch to activities that do not stress your hip. · Take your medicines exactly as prescribed. Call your doctor if you think you are having a problem with your medicine. · Ask your doctor if you can take an over-the-counter pain medicine, such as acetaminophen (Tylenol), ibuprofen (Advil, Motrin), or naproxen (Aleve). Be safe with medicines. Read and follow all instructions on the label.   · To prevent stiffness, gently move the hip joint as much as you can without pain every day. As the pain gets better, keep doing range-of-motion exercises. Ask your doctor for exercises that will make the muscles around the hip joint stronger. Do these as directed. · You can slowly return to the activity that caused the pain, but do it with less effort until you can do it without pain or swelling. Be sure to warm up before and stretch after you do the activity. When should you call for help? Call your doctor now or seek immediate medical care if:    · You have a fever.     · You have increased swelling or redness in your hip.     · You cannot use your hip, or the pain in your hip gets worse. Watch closely for changes in your health, and be sure to contact your doctor if:    · You have pain for 2 weeks or longer despite home treatment. Where can you learn more? Go to http://www.gray.com/  Enter I156 in the search box to learn more about \"Hip Bursitis: Care Instructions. \"  Current as of: July 1, 2021               Content Version: 13.0  © 2006-2021 [employer]. Care instructions adapted under license by [employer] (which disclaims liability or warranty for this information). If you have questions about a medical condition or this instruction, always ask your healthcare professional. [place] [address] any warranty or liability for your use of this information. The patient's back is neurovascularly intact and appears to have good symmetry on exam with no muscle atrophy noted. Normal curvature of the spine with positive tenderness to palpation. Decreased range of motion in all planes secondary to pain but normal strength. No rashes, echymosis or other skin lesions noted. The patients bilateral lower extremities are grossly neurovascularly intact with good cap refill, full range of motion and strength. No swelling, echymosis or instability noted.  Negative straight leg raise, negative jim's and negative lachman's. No tenderness to palpation. No foot drop present and patient has a normal gait.

## 2022-03-14 ENCOUNTER — HOSPITAL ENCOUNTER (OUTPATIENT)
Dept: PHYSICAL THERAPY | Age: 67
Discharge: HOME | End: 2022-03-14
Payer: MEDICARE

## 2022-03-14 PROCEDURE — 97162 PT EVAL MOD COMPLEX 30 MIN: CPT

## 2022-03-14 NOTE — PROGRESS NOTES
[address] [facility] Gracia Sanders, [address] [facility], [address] [place] [address]  PLAN OF CARE / 2 [facility]  Patient Name: Jese Gannon : 1955   Medical   Diagnosis: Pain in right hip [M25.551] Treatment Diagnosis: Right hip pain   Onset Date: 2022     Referral Source: Venessa Warner MD [facility]): 3/14/2022   Prior Hospitalization: See medical history Provider #: [id number]   Prior Level of Function: independent   Comorbidities: Arthritis, Cancer, ED, HLD, Sleep apnea, Trigeminal neuralgia, R ACLR (),, Hernia repair, R ankle sx (, ), L RTR x 2, R shoulder arthroscopy, R elbow tendon repair   Medications: Verified on Patient Summary List   The Plan of Care and following information is based on the information from the initial evaluation.   ==========================================================================================  Assessment / Functional Analysis:    Pt is a 77y.o. year old [facility] male who presents to outpatient clinic today with chief complaint of persistent R hip pain s/p slip and fall while walking in the woods in 2022. Pt is an independent ambulator with known LLD, left < right. Pt presents today with impairments that include decreased hip extension strength bilaterally, B painful hip flexion AROM, gastrocnemius tension, antalgic gait. Palpable tenderness noted most along greater trochanters. Pt educated on possible benefit of obtaining shoe lift an/taran custom orthotic to aid in leg length symmetry.  Pt will benefit from skilled PT intervention to target deficits in effort to maximize pain-free daily activity and restore PLOF within home, work & community.     ==========================================================================================  Eval Complexity: History: MEDIUM  Complexity : 1-2 comorbidities / personal factors will impact the outcome/ POC Exam:LOW Complexity : 1-2 Standardized tests and measures addressing body structure, function, activity limitation and / or participation in recreation  Presentation: MEDIUM Complexity : Evolving with changing characteristics  Clinical Decision Making:MEDIUM Complexity : FOTO score of 26-74Overall Complexity:MEDIUM    Problem List: pain affecting function, decrease ROM, decrease strength, impaired gait/ balance, decrease ADL/ functional abilitiies and decrease activity tolerance   Treatment Plan may include any combination of the following: Therapeutic exercise, Therapeutic activities, Neuromuscular re-education, Physical agent/modality, Gait/balance training, Manual therapy, Patient education, Self Care training and Functional mobility training  Patient / Family readiness to learn indicated by: asking questions, trying to perform skills and interest  Persons(s) to be included in education: patient (P)  Barriers to Learning/Limitations: None      Patient self reported health status: good  Rehabilitation Potential: good    Objective Measures:  Palpation: tenderness noted along bilateral greater trochanters, left > right; left iliac crest inferior to right in standing; intact to light touch                                         ROM/Strength                   AROM                          PROM                       Strength (1-5)  Hip Left Right Left Right Left Right   Flexion WFL* WFL*     5/5 5/5   Extension [facility] WFL     4+/5 4+/5*   Abduction WFL WFL     5/5 5/5   Adduction               ER               IR               Knee Left Right Left Right Left Right   [facility]     5/5 5/5   Flexion [facility] WFL     5/5 5/5               *indicative of pain                Flexibility:   Hip Flexor:  (L) Tightness= []? WNL   []? Min   []? Mod   []? Severe                       (R) Tightness= []? WNL   []? Min   []? Mod   []? Severe  Hamstrings:               (L) Tightness= []? WNL   []? Min   []? Mod   []?  Severe (R) Tightness= []? WNL   []? Min   []? Mod   []? Severe  Quadriceps:               (L) Tightness= []? WNL   []? Min   []? Mod   []? Severe                       (R) Tightness= []? WNL   []? Min   []? Mod   []? Severe  Gastroc:               (L) Tightness= []? WNL   []? Min   [x]? Mod   []? Severe                       (R) Tightness= []? WNL   []? Min   []? Mod   []? Severe                                                                                            Optional Tests  Luis Daniel/ Trini Test:    []? Neg    [x]? Pos  Jarocho Test:              []? Neg    []? Pos  Scouring Test:             []? Neg    [x]? Pos  Trendelenberg:           []? Neg    [x]? Pos         [x]? Left    []? Right  OberTest:                    [x]? Neg    []? Pos  Ely's Test:                    []? Neg    []? Pos  Piriformis Test:            []? Neg    [x]? Pos  Sub-talor alignment:    []? Neurtral      []? Pronation    []? Supination  Forefoot alignment:     []? Neutral       []? Varus          []? Valgus  Functional Leg Length: right > left         Gait:                [x]? Normal    []? Abnormal    []? Antalgic    []? NWB    Device: none                          Describe: trendelenburg observed, increased lumbar sidebend to left      Other tests/ comments: FOTO: 68, LEFS: 59.8              Short Term Goals:  1. Pt will be independent with HEP to improve hip strength & ROM in 3 weeks. 2. Pt will demonstrate pain-free hip AROM in all planes for improved ADL efficiency in 3 weeks. 3. Pt will report no greater than 6/10 pain in R hip with daily activity in 3 weeks. Long Term Goals:  1. Pt will improve B hip extensor strength to 5/5 for improved stability standing task in 6 weeks. 2. Pt will improve LEFS >60 for improved function & quality of life in 6 weeks. 3. Pt will report no greater than 1-2/10 pain in hip with daily activity and improved sleep at night in 6 weeks.        Frequency / Duration: Patient to be seen  2  times per week for 6  weeks:  Patient / Caregiver education and instruction: self care and activity modification    Therapist Signature: Eduard Lorenzana PT. DPT Date: 1/31/0136   Certification Period: 3/14/22 - 6/12/22 Time: 8:48 AM   ===========================================================================================  I certify that the above Physical Therapy Services are being furnished while the patient is under my care. I agree with the treatment plan and certify that this therapy is necessary. Physician Signature:        Date:       Time:     Please sign and return to [facility] or you may fax the signed copy to [phone]. Please call [phone] if more information required. Thank you.

## 2022-03-14 NOTE — PROGRESS NOTES
PT DAILY TREATMENT NOTE/HIP [id number]    Patient Name: De Raw  Date:3/14/2022  : 1955  [x]  Patient  Verified  Payor: Loraine Fulton / Plan: [place] MEDICARE PART A & B / Product Type: Medicare /    In MWEL:8442  Out time:920  Total Treatment Time (min): 31  Visit #: 1    Medicare/BCBS Only   Total Timed Codes (min):  0 1:1 Treatment Time:  31     Treatment Area: Pain in right hip [M25.551]    SUBJECTIVE  Pt reports having hip and back pain s/p fall 2 years ago and has been exacerbated by tripping in the woods in January. Pt received a prednisone dose pack and injection in both hips a few weeks ago and states that it helped with pain. Pain provoked when laying down in bed, unable to lay on hips. Movement eases pain. Pt reports independence with all activities, not limited by pain. 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 Pt denies any numbness nor tingling, no other falls since January.      Patient Goals: \"pain reduction\"    Pain Level (0-10 scale): Current: 1/10  Worst: 7/10  []Sharp  []Dull  [x]Achy []Burning []Throbbing []N&T []Other:  []constant []intermittent [x]improving []worsening []no change since onset    Past Medical History:   Diagnosis Date    Acute sinusitis 2020    Allergic rhinitis 2020    Arthritis     Cancer ([facility] [address]     skin    Contusion of chest 2020    Erectile dysfunction 2020    Hyperlipidemia 2020    Sleep apnea     does have to use cpap    Trigeminal neuralgia 2020     Past Surgical History:   Procedure Laterality Date    HX ACL RECONSTRUCTION      right knee     HX HEENT      UPPP    HX HEENT      sinus surgery     HX HERNIA REPAIR      umbilical    HX ORTHOPAEDIC  1964    left leg tendon lengthing     HX ORTHOPAEDIC      right ankle     HX ORTHOPAEDIC      left shoulder rotator cuff x 2     HX ORTHOPAEDIC      right shoulder x 2     HX ORTHOPAEDIC      right elbow tendon repair     HX ORTHOPAEDIC  2017    right ankle     HX TONSILLECTOMY  1996       Any medication changes, allergies to medications, adverse drug reactions, diagnosis change, or new procedure performed?: [x] No    [] Yes (see summary sheet for update)      OBJECTIVE/EXAMINATION  Palpation: tenderness noted along bilateral greater trochanters, left > right; left iliac crest inferior to right in standing; intact to light touch          ROM/Strength        AROM                     PROM        Strength (1-5)  Hip Left Right Left Right Left Right   Flexion WFL* WFL*   5/5 5/5   Extension [facility] WFL   4+/5 4+/5*   Abduction WFL WFL   5/5 5/5   Adduction         ER         IR         Knee Left Right Left Right Left Right   [facility]   5/5 5/5   Flexion [facility] WFL   5/5 5/5    *indicative of pain     Flexibility:   Hip Flexor:  (L) Tightness= [] WNL   [] Min   [] Mod   [] Severe    (R) Tightness= [] WNL   [] Min   [] Mod   [] Severe  Hamstrings:    (L) Tightness= [] WNL   [] Min   [] Mod   [] Severe    (R) Tightness= [] WNL   [] Min   [] Mod   [] Severe  Quadriceps:    (L) Tightness= [] WNL   [] Min   [] Mod   [] Severe    (R) Tightness= [] WNL   [] Min   [] Mod   [] Severe  Gastroc:    (L) Tightness= [] WNL   [] Min   [x] Mod   [] Severe    (R) Tightness= [] WNL   [] Min   [] Mod   [] Severe                                    Optional Tests  Luis Daniel/ Trini Test: [] Neg    [x] Pos  Jarocho Test:  [] Neg    [] Pos  Scouring Test:  [] Neg    [x] Pos  Trendelenberg:  [] Neg    [x] Pos [x] Left    [] Right  OberTest:   [x] Neg    [] Pos  Ely's Test:  [] Neg    [] Pos  Piriformis Test:  [] Neg    [x] Pos  Sub-talor alignment: [] Neurtral [] Pronation [] Supination  Forefoot alignment: [] Neutral [] Varus [] Valgus  Functional Leg Length: right > left       Gait:  [x] Normal    [] Abnormal    [] Antalgic    [] NWB    Device: none    Describe: trendelenburg observed, increased lumbar sidebend to left     Other tests/ comments: FOTO: 68, LEFS: 59.8          31 min [x]Eval                  []Re-Eval               With   [] TE   [] TA   [] neuro   [x] other: Patient Education: [x] Review HEP    [] Progressed/Changed HEP based on:   [x] positioning   [x] body mechanics   [] transfers   [] heat/ice application    [] other:      Pain Level (0-10 scale) post treatment: 1/10      ASSESSMENT:      [x]  See Plan of Care  []  See progress note/recertification  []  See Discharge Summary           Ru Devries, PT, DPT  3/14/2022  8:48 AM

## 2022-03-15 ENCOUNTER — OFFICE VISIT (OUTPATIENT)
Dept: ORTHOPEDIC SURGERY | Age: 67
End: 2022-03-15
Payer: MEDICARE

## 2022-03-15 DIAGNOSIS — M72.2 PLANTAR FASCIITIS OF RIGHT FOOT: ICD-10-CM

## 2022-03-15 DIAGNOSIS — M79.671 RIGHT FOOT PAIN: Primary | ICD-10-CM

## 2022-03-15 PROCEDURE — G8432 DEP SCR NOT DOC, RNG: HCPCS | Performed by: ORTHOPAEDIC SURGERY

## 2022-03-15 PROCEDURE — 99213 OFFICE O/P EST LOW 20 MIN: CPT | Performed by: ORTHOPAEDIC SURGERY

## 2022-03-15 PROCEDURE — G8419 CALC BMI OUT NRM PARAM NOF/U: HCPCS | Performed by: ORTHOPAEDIC SURGERY

## 2022-03-15 PROCEDURE — 1101F PT FALLS ASSESS-DOCD LE1/YR: CPT | Performed by: ORTHOPAEDIC SURGERY

## 2022-03-15 PROCEDURE — 3017F COLORECTAL CA SCREEN DOC REV: CPT | Performed by: ORTHOPAEDIC SURGERY

## 2022-03-15 PROCEDURE — G8427 DOCREV CUR MEDS BY ELIG CLIN: HCPCS | Performed by: ORTHOPAEDIC SURGERY

## 2022-03-15 PROCEDURE — G8536 NO DOC ELDER MAL SCRN: HCPCS | Performed by: ORTHOPAEDIC SURGERY

## 2022-03-15 PROCEDURE — 20606 DRAIN/INJ JOINT/BURSA W/US: CPT | Performed by: ORTHOPAEDIC SURGERY

## 2022-03-15 RX ORDER — TRIAMCINOLONE ACETONIDE 40 MG/ML
40 INJECTION, SUSPENSION INTRA-ARTICULAR; INTRAMUSCULAR ONCE
Status: COMPLETED | OUTPATIENT
Start: 2022-03-15 | End: 2022-03-15

## 2022-03-15 RX ORDER — LIDOCAINE HYDROCHLORIDE 10 MG/ML
1 INJECTION INFILTRATION; PERINEURAL ONCE
Status: COMPLETED | OUTPATIENT
Start: 2022-03-15 | End: 2022-03-15

## 2022-03-15 RX ADMIN — TRIAMCINOLONE ACETONIDE 40 MG: 40 INJECTION, SUSPENSION INTRA-ARTICULAR; INTRAMUSCULAR at 09:00

## 2022-03-15 RX ADMIN — LIDOCAINE HYDROCHLORIDE 1 ML: 10 INJECTION INFILTRATION; PERINEURAL at 09:00

## 2022-03-15 NOTE — PATIENT INSTRUCTIONS
Foot Pain: Care Instructions  Your Care Instructions     Foot injuries that cause pain and swelling are fairly common. Almost all sports or home repair projects can cause a misstep that ends up as foot pain. Normal wear and tear, especially as you get older, also can cause foot pain. Most minor foot injuries will heal on their own, and home treatment is usually all you need to do. If you have a severe injury, you may need tests and treatment. Follow-up care is a key part of your treatment and safety. Be sure to make and go to all appointments, and call your doctor if you are having problems. It's also a good idea to know your test results and keep a list of the medicines you take. How can you care for yourself at home? · Take pain medicines exactly as directed. ? If the doctor gave you a prescription medicine for pain, take it as prescribed. ? If you are not taking a prescription pain medicine, ask your doctor if you can take an over-the-counter medicine. · Rest and protect your foot. Take a break from any activity that may cause pain. · Put ice or a cold pack on your foot for 10 to 20 minutes at a time. Put a thin cloth between the ice and your skin. · Prop up the sore foot on a pillow when you ice it or anytime you sit or lie down during the next 3 days. Try to keep it above the level of your heart. This will help reduce swelling. · Your doctor may recommend that you wrap your foot with an elastic bandage. Keep your foot wrapped for as long as your doctor advises. · If your doctor recommends crutches, use them as directed. · Wear roomy footwear. · As soon as pain and swelling end, begin gentle exercises of your foot. Your doctor can tell you which exercises will help. When should you call for help? Call 911 anytime you think you may need emergency care. For example, call if:    · Your foot turns pale, white, blue, or cold.    Call your doctor now or seek immediate medical care if:    · You cannot move or stand on your foot.     · Your foot looks twisted or out of its normal position.     · Your foot is not stable when you step down.     · You have signs of infection, such as:  ? Increased pain, swelling, warmth, or redness. ? Red streaks leading from the sore area. ? Pus draining from a place on your foot. ? A fever.     · Your foot is numb or tingly. Watch closely for changes in your health, and be sure to contact your doctor if:    · You do not get better as expected.     · You have bruises from an injury that last longer than 2 weeks. Where can you learn more? Go to http://www.bautista.com/  Enter D999 in the search box to learn more about \"Foot Pain: Care Instructions. \"  Current as of: July 1, 2021               Content Version: 13.2  © 7790-3922 [employer]wise[employer]. Care instructions adapted under license by [employer] (which disclaims liability or warranty for this information). If you have questions about a medical condition or this instruction, always ask your healthcare professional. [place] [address] any warranty or liability for your use of this information.

## 2022-03-15 NOTE — LETTER
Tommy Factor   1955   [id number]       3/15/2022       I hereby authorize and direct Jayce Dale MD, Blossom Pierre, and whomever he may designate as his associate to perform upon myself the following procedure:    Injection of: Kenalog, RT FOOT    If any unforeseen condition arises in the course of the procedure, I further authorize him and his associated and/or assistant(s) to do whatever he/she deems advisable. The nature, purpose, benefits, risks, side effects, likelihood of achieving goals, and potential problems that might occur during recuperation, risks for not receiving the proposed care, treatment and services and alternatives of the procedure have been fully explained to me by my physician including, but not limited to:    Swelling, joint pain, skin pigment changes, worsening of condition, and failure to improve. I acknowledge that no guarantee or assurance has been made to me as to the results that may be obtained or the likelihood of success.                 _______________________________________     Signature of patient or authorized representative                [employer] [facility] fax: [phone]

## 2022-03-15 NOTE — PROGRESS NOTES
Name: Lori Hines    : 1955     Service Dept: [facility]    Chief Complaint   Patient presents with    Foot Pain        There were no vitals taken for this visit. No Known Allergies     Current Outpatient Medications   Medication Sig Dispense Refill    methylPREDNISolone (MEDROL DOSEPACK) 4 mg tablet Per dose pack instructions 1 Dose Pack 0    atorvastatin (LIPITOR) 20 mg tablet Take 1 Tablet by mouth daily. 30 Tablet 1    sildenafil citrate (VIAGRA) 100 mg tablet Take 1 Tablet by mouth as needed for Erectile Dysfunction. 8 Tablet 5    sildenafil citrate (VIAGRA) 100 mg tablet Take 1 tablet by mouth once daily for 30 days 2 Tablet 0    famciclovir (FAMVIR) 500 mg tablet TAKE 1 TABLET BY MOUTH THREE TIMES DAILY 60 Tab 0    meloxicam (MOBIC) 15 mg tablet Take 1 Tab by mouth daily. 90 Tab 1    azelastine (ASTEPRO) 0.15 % (205.5 mcg) two (2) times a day.  bupivacaine, PF, (MARCAINE) 0.5 % (5 mg/mL) injection once.  carBAMazepine XR (TEGretol XR) 100 mg SR tablet Take  by mouth two (2) times a day.  EPINEPHrine (EpiPen) 0.3 mg/0.3 mL injection 0.3 mg by IntraMUSCular route once as needed for Allergic Response.  carBAMazepine (Epitol) 200 mg tablet Take 200 mg by mouth three (3) times daily.  indomethacin (INDOCIN) 50 mg capsule Take  by mouth three (3) times daily.  lysine (L-LYSINE) 500 mg tab tablet Take 1,000 mg by mouth daily.  ascorbic acid, vitamin C, (VITAMIN C) 500 mg tablet Take 500 mg by mouth daily.  loperamide (IMODIUM A-D) 2 mg tablet Take 4 mg by mouth daily.        Current Facility-Administered Medications   Medication Dose Route Frequency Provider Last Rate Last Admin    [COMPLETED] triamcinolone acetonide (KENALOG-40) 40 mg/mL injection 40 mg  40 mg Other ONCE Jayce Peace MD   40 mg at 03/15/22 0900    [COMPLETED] lidocaine (XYLOCAINE) 10 mg/mL (1 %) injection 1 mL  1 mL Other ONCE Vaishnavi Dye MD   1 mL at 03/15/22 0900      Patient Active Problem List   Diagnosis Code    Acute sinusitis J01.90    Allergic rhinitis J30.9    Contusion of chest S18.12A    Hyperlipidemia E78.5    Erectile dysfunction N52.9    Trigeminal neuralgia G50.0    Close exposure to COVID-19 virus Z20.822      Family History   Problem Relation Age of Onset    No Known Problems Mother     No Known Problems Father       Social History     Socioeconomic History    Marital status:    Tobacco Use    Smoking status: Former Smoker     Packs/day: 1.00     Years: 15.00     Pack years: 15.00     Quit date:      Years since quittin.2    Smokeless tobacco: Former User   Vaping Use    Vaping Use: Never used   Substance and Sexual Activity    Alcohol use: Yes     Alcohol/week: 12.0 standard drinks     Types: 12 Cans of beer per week    Drug use: Never    Sexual activity: Not Currently      Past Surgical History:   Procedure Laterality Date    HX ACL RECONSTRUCTION      right knee     HX HEENT      UPPP    HX HEENT      sinus surgery     HX HERNIA REPAIR      umbilical    HX ORTHOPAEDIC  1964    left leg tendon lengthing     HX ORTHOPAEDIC  1979    right ankle     HX ORTHOPAEDIC      left shoulder rotator cuff x 2     HX ORTHOPAEDIC      right shoulder x 2     HX ORTHOPAEDIC      right elbow tendon repair     HX ORTHOPAEDIC      right ankle     HX TONSILLECTOMY        Past Medical History:   Diagnosis Date    Acute sinusitis 2020    Allergic rhinitis 2020    Arthritis     Cancer ([address]     skin    Contusion of chest 2020    Erectile dysfunction 2020    Hyperlipidemia 2020    Sleep apnea     does have to use cpap    Trigeminal neuralgia 2020        I have reviewed and agree with [facility] and intake form in chart and the record furthermore I have reviewed prior medical record(s) regarding this patients care during this appointment. Review of Systems:   Patient is a pleasant appearing individual, appropriately dressed, well hydrated, well nourished, who is alert, appropriately oriented for age, and in no acute distress with a normal gait and normal affect who does not appear to be in any significant pain. Physical Exam:  Right foot- grossly neurovascularly intact, good cap refill, no swelling, no instability, full range of motion, positive point tenderness noted on the plantar aspect of the heel, skin is intact. Left foot-Grossly neurovascularly intact, good cap refill, full range of motion, no weakness, no swelling, no point tenderness, no skin lesions. Procedure Documentation:    I discussed in detail the risks, benefits and complications of an injection which included but are not limited to infection, skin reactions, hot swollen joint, and anaphylaxis with the patient. The patient verbalized understanding and gave informed consent for the injection. The patient's right foot was prepped using sterile alcohol solution. A sterile needle was inserted into the right foot and the mixture of 1 mL Lidocaine 1%, 1 mL Kenalog 40 mg was injected under sterile technique. The needle was withdrawn and the puncture site sealed with a Band-Aid. Technique: Under sterile conditions a [employer] ultrasound unit with a variable frequency (7.0-14.0 MHz) linear transducer was used to localize the placement of needle into the right foot. Findings: Successful needle placement for foot injection. Final images were taken and saved for permanent record. The patient tolerated the injection well. The patient was instructed to call the office immediately if there is any pain, redness, warmth, fever, or chills. Encounter Diagnoses     ICD-10-CM ICD-9-CM   1. Right foot pain  M79.671 729.5   2. Plantar fasciitis of right foot  M72.2 728.71       HPI:  The patient is here with a chief complaint of right heel pain, sharp, throbbing pain.   It has been the same.  Pain is 3/10. X-rays of the right heel are positive for small calcaneal spur. Assessment/Plan:  1. Right heel plantar fasciitis. Plan will be for cortisone injection today. See the patient back in 4 weeks and go from there. If the patient gets worse, he is to give me a call. As part of continued conservative pain management options the patient was advised to utilize Tylenol or OTC NSAIDS as long as it is not medically contraindicated. Return to Office: Follow-up and Dispositions    · Return in about 4 weeks (around 4/12/2022). Administrations This Visit     lidocaine (XYLOCAINE) 10 mg/mL (1 %) injection 1 mL     Admin Date  03/15/2022 Action  Given Dose  1 mL Route  Other Administered By  Deidra Parker LPN          triamcinolone acetonide (KENALOG-40) 40 mg/mL injection 40 mg     Admin Date  03/15/2022 Action  Given Dose  40 mg Route  Other Administered By  Deidra Parker LPN               Scribed by Radha Madrid LPN as dictated by [facility]EENA Mejia MD.  Documentation True and Accepted Jayce Mejia MD

## 2022-03-15 NOTE — LETTER
3/16/2022    Patient: Abilio Baugh   YOB: 1955   Date of Visit: 3/15/2022     Sage Wong NP  [address]    Dear Sage Wong NP,      Thank you for referring Mr. Gavino Torrez to [address] [place] [address] for evaluation. My notes for this consultation are attached. If you have questions, please do not hesitate to call me. I look forward to following your patient along with you.       Sincerely,    Julian Lucia MD

## 2022-03-17 ENCOUNTER — HOSPITAL ENCOUNTER (OUTPATIENT)
Dept: PHYSICAL THERAPY | Age: 67
Discharge: HOME | End: 2022-03-17
Payer: MEDICARE

## 2022-03-17 PROCEDURE — 97110 THERAPEUTIC EXERCISES: CPT

## 2022-03-17 NOTE — PROGRESS NOTES
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 Pain Level (0-10 scale): 1    Any medication changes, allergies to medications, adverse drug reactions, diagnosis change, or new procedure performed?: [x] No    [] Yes (see summary sheet for update)    OBJECTIVE  Modality rationale: Declined   Min Type Additional Details    [] Estim: []Att   []Unatt  []TENS instruct                 []IFC  []Premod []NMES                       []Other:  []w/US   []w/ice   []w/heat  Position:  Location:    []  Traction: [] Cervical       []Lumbar                       [] Prone          []Supine                       []Intermittent   []Continuous Lbs:  [] before manual  [] after manual    []  Ultrasound: []Continuous   [] Pulsed                           []1MHz   []3MHz Location:  W/cm2:    []  Ice     []  heat  []  Ice massage Position:  Location:    []  Vasopneumatic Device Pressure: [] lo [] med [] hi   Temp: [] lo [] med [] hi   [] Skin assessment post-treatment:  []intact []redness- no adverse reaction       []redness - adverse reaction:     39 min Therapeutic Exercise:  [x] See flow sheet :   Rationale: increase ROM, increase strength, improve coordination and improve balance to improve the patients ability to PLOF while remaining pain free.       With TE  TA   NR  GT   Misc Patient Education: [x] Review HEP    [] Progressed/Changed HEP based on:   [] positioning   [] body mechanics   [] transfers   [] heat/ice application        Pain Level (0-10 scale) post treatment: 0    ASSESSMENT/Changes in Function: Initiated POC working to improve range of motion, strength, gait, balance and strength. Focused on stretching to B hips and lower back. Pt experienced cramping and soreness in supine position which he attributes to yard work. Introduced standing hip 3 way with pateint exhibiting good control and strength throughout. Plan to increase repetitions next visit. HEP reviewed and printout given to take on vacation next week. Will continue POC progressing as able. Patient will continue to benefit from skilled PT services to modify and progress therapeutic interventions, address functional mobility deficits, address ROM deficits, address strength deficits, analyze and address soft tissue restrictions, analyze and cue movement patterns, analyze and modify body mechanics/ergonomics, assess and modify postural abnormalities and address imbalance/dizziness to attain remaining goals.      [x]  See Plan of Care  []  See progress note/recertification  []  See Discharge Summary         PLAN  []  Upgrade activities as tolerated     [x]  Continue plan of care  []  Update interventions per flow sheet       []  Discharge due to:_  []  Other:    Bethany De La Garza  3/17/2022  3:19 PM

## 2022-03-19 PROBLEM — Z20.822 CLOSE EXPOSURE TO COVID-19 VIRUS: Status: ACTIVE | Noted: 2020-07-22

## 2022-03-19 PROBLEM — J30.9 ALLERGIC RHINITIS: Status: ACTIVE | Noted: 2020-07-22

## 2022-03-19 PROBLEM — S20.219A CONTUSION OF CHEST: Status: ACTIVE | Noted: 2020-07-22

## 2022-03-19 PROBLEM — E78.5 HYPERLIPIDEMIA: Status: ACTIVE | Noted: 2020-07-22

## 2022-03-19 PROBLEM — J01.90 ACUTE SINUSITIS: Status: ACTIVE | Noted: 2020-07-22

## 2022-03-19 PROBLEM — N52.9 ERECTILE DYSFUNCTION: Status: ACTIVE | Noted: 2020-07-22

## 2022-03-20 PROBLEM — G50.0 TRIGEMINAL NEURALGIA: Status: ACTIVE | Noted: 2020-07-22

## 2022-03-31 ENCOUNTER — APPOINTMENT (OUTPATIENT)
Dept: PHYSICAL THERAPY | Age: 67
End: 2022-03-31
Payer: MEDICARE

## 2022-05-04 ENCOUNTER — OFFICE VISIT (OUTPATIENT)
Dept: FAMILY MEDICINE CLINIC | Age: 67
End: 2022-05-04
Payer: MEDICARE

## 2022-05-04 VITALS
HEIGHT: 71 IN | WEIGHT: 192.2 LBS | BODY MASS INDEX: 26.91 KG/M2 | HEART RATE: 74 BPM | TEMPERATURE: 97.5 F | RESPIRATION RATE: 17 BRPM | SYSTOLIC BLOOD PRESSURE: 115 MMHG | DIASTOLIC BLOOD PRESSURE: 72 MMHG | OXYGEN SATURATION: 99 %

## 2022-05-04 DIAGNOSIS — G50.0 TRIGEMINAL NEURALGIA: ICD-10-CM

## 2022-05-04 DIAGNOSIS — N52.9 ERECTILE DYSFUNCTION, UNSPECIFIED ERECTILE DYSFUNCTION TYPE: Primary | ICD-10-CM

## 2022-05-04 PROCEDURE — 1101F PT FALLS ASSESS-DOCD LE1/YR: CPT | Performed by: NURSE PRACTITIONER

## 2022-05-04 PROCEDURE — 3017F COLORECTAL CA SCREEN DOC REV: CPT | Performed by: NURSE PRACTITIONER

## 2022-05-04 PROCEDURE — G8419 CALC BMI OUT NRM PARAM NOF/U: HCPCS | Performed by: NURSE PRACTITIONER

## 2022-05-04 PROCEDURE — 99214 OFFICE O/P EST MOD 30 MIN: CPT | Performed by: NURSE PRACTITIONER

## 2022-05-04 PROCEDURE — G8536 NO DOC ELDER MAL SCRN: HCPCS | Performed by: NURSE PRACTITIONER

## 2022-05-04 PROCEDURE — G8427 DOCREV CUR MEDS BY ELIG CLIN: HCPCS | Performed by: NURSE PRACTITIONER

## 2022-05-04 PROCEDURE — G8432 DEP SCR NOT DOC, RNG: HCPCS | Performed by: NURSE PRACTITIONER

## 2022-05-04 RX ORDER — SILDENAFIL 100 MG/1
TABLET, FILM COATED ORAL
Qty: 30 TABLET | Refills: 1 | Status: SHIPPED | OUTPATIENT
Start: 2022-05-04 | End: 2022-05-04 | Stop reason: SDUPTHER

## 2022-05-04 RX ORDER — FAMCICLOVIR 500 MG/1
TABLET ORAL
Qty: 60 TABLET | Refills: 0 | Status: SHIPPED | OUTPATIENT
Start: 2022-05-04

## 2022-05-04 RX ORDER — CARBAMAZEPINE 100 MG/1
100 TABLET, EXTENDED RELEASE ORAL 2 TIMES DAILY
Qty: 60 TABLET | Refills: 2 | Status: SHIPPED | OUTPATIENT
Start: 2022-05-04

## 2022-05-04 NOTE — PROGRESS NOTES
Esperanza Gómez presents today for   Chief Complaint   Patient presents with   [address]     establish care with provider was a [place] patient     Foot Pain     has a spot on the hill of his right foot thats very painful        Is someone accompanying this pt? No     Is the patient using any DME equipment during OV? No     Depression Screening:  3 most recent PHQ Screens 5/4/2022   Little interest or pleasure in doing things Not at all   Feeling down, depressed, irritable, or hopeless Not at all   Total Score PHQ 2 0       Learning Assessment:  No flowsheet data found. Fall Risk  Fall Risk Assessment, last 12 mths 5/4/2022   Able to walk? Yes   Fall in past 12 months? 0   Do you feel unsteady? 0   Are you worried about falling 0       ADL  No flowsheet data found. Travel Screening:    Travel Screening     Question   Response    In the last 10 days, have you been in contact with someone who was confirmed or suspected to have Coronavirus/COVID-19? No / Unsure    Have you had a COVID-19 viral test in the last 10 days? No    Do you have any of the following new or worsening symptoms? None of these    Have you traveled internationally or domestically in the last month? No      Travel History   Travel since 04/04/22    No documented travel since 04/04/22         Health Maintenance reviewed and discussed and ordered per Provider. Health Maintenance Due   Topic Date Due    Hepatitis C Screening  Never done    COVID-19 Vaccine (1) Never done    Shingrix Vaccine Age 50> (1 of 2) Never done    AAA Screening 73-69 YO Male Smoking Patients  Never done    Pneumococcal 65+ years (2 - PPSV23 or PCV20) 01/24/2022   . Coordination of Care:  1. \"Have you been to the ER, urgent care clinic since your last visit? Hospitalized since your last visit? \" No    2. \"Have you seen or consulted any other health care providers outside of the [address] [place] [address] since your last visit? \" No     3.  For patients aged 39-70: Has the patient had a colonoscopy? Yes - no Care Gap present     If the patient is female:    4. For patients aged 41-77: Has the patient had a mammogram within the past 2 years? NA - based on age/sex    5. For patients aged 21-65: Has the patient had a pap smear?  NA - based on age/sex

## 2022-05-04 NOTE — PROGRESS NOTES
History of Present Illness  Esperanza Gómez is a 77 y.o. male who presents today for:    Chief Complaint   Patient presents with   [facility] Establish Care     establish care with provider was a Kris patient     Foot Pain     has a spot on the hill of his right foot thats very painful      Past Medical History  Past Medical History:   Diagnosis Date    Acute sinusitis 7/22/2020    Allergic rhinitis 7/22/2020    Arthritis     Cancer ([address]     skin    Contusion of chest 7/22/2020    Erectile dysfunction 7/22/2020    Hyperlipidemia 7/22/2020    Sleep apnea     does have to use cpap    Trigeminal neuralgia 7/22/2020        Surgical History  Past Surgical History:   Procedure Laterality Date    HX ACL RECONSTRUCTION  2000    right knee     HX HEENT  1996    UPPP    HX HEENT      sinus surgery     HX HERNIA REPAIR      umbilical    HX ORTHOPAEDIC  09/1964    left leg tendon lengthing     HX ORTHOPAEDIC  1979    right ankle     HX ORTHOPAEDIC      left shoulder rotator cuff x 2     HX ORTHOPAEDIC      right shoulder x 2     HX ORTHOPAEDIC      right elbow tendon repair     HX ORTHOPAEDIC  2017    right ankle     HX TONSILLECTOMY  1996        Current Medications  Current Outpatient Medications   Medication Sig    sildenafil citrate (VIAGRA) 100 mg tablet Take 1 Tablet by mouth as needed for Erectile Dysfunction.  sildenafil citrate (VIAGRA) 100 mg tablet Take 1 tablet by mouth once daily for 30 days    famciclovir (FAMVIR) 500 mg tablet TAKE 1 TABLET BY MOUTH THREE TIMES DAILY    meloxicam (MOBIC) 15 mg tablet Take 1 Tab by mouth daily.  azelastine (ASTEPRO) 0.15 % (205.5 mcg) two (2) times a day.  EPINEPHrine (EpiPen) 0.3 mg/0.3 mL injection 0.3 mg by IntraMUSCular route once as needed for Allergic Response.  lysine (L-LYSINE) 500 mg tab tablet Take 1,000 mg by mouth daily.  ascorbic acid, vitamin C, (VITAMIN C) 500 mg tablet Take 500 mg by mouth daily.     loperamide (IMODIUM A-D) 2 mg tablet Take 4 mg by mouth daily.  methylPREDNISolone (MEDROL DOSEPACK) 4 mg tablet Per dose pack instructions (Patient not taking: Reported on 2022)    atorvastatin (LIPITOR) 20 mg tablet Take 1 Tablet by mouth daily.  bupivacaine, PF, (MARCAINE) 0.5 % (5 mg/mL) injection once. (Patient not taking: Reported on 2022)    carBAMazepine XR (TEGretol XR) 100 mg SR tablet Take  by mouth two (2) times a day.  carBAMazepine (EpitoL) 200 mg tablet Take 200 mg by mouth three (3) times daily.  indomethacin (INDOCIN) 50 mg capsule Take  by mouth three (3) times daily. (Patient not taking: Reported on 2022)     No current facility-administered medications for this visit. Allergies/Drug Reactions  No Known Allergies     Family History  Family History   Problem Relation Age of Onset    No Known Problems Mother     No Known Problems Father         Social History  Social History     Tobacco Use    Smoking status: Former Smoker     Packs/day: 1.00     Years: 15.00     Pack years: 15.00     Quit date:      Years since quittin.3    Smokeless tobacco: Former User   Vaping Use    Vaping Use: Never used   Substance Use Topics    Alcohol use:  Yes     Alcohol/week: 12.0 standard drinks     Types: 12 Cans of beer per week    Drug use: Never        Health Maintenance   Topic Date Due    Hepatitis C Screening  Never done    COVID-19 Vaccine (1) Never done    Shingrix Vaccine Age 50> (1 of 2) Never done    AAA Screening 73-67 YO Male Smoking Patients  Never done    Pneumococcal 65+ years (2 - PPSV23 or PCV20) 2022    Flu Vaccine (Season Ended) 2022    Medicare Yearly Exam  09/15/2022    Lipid Screen  09/15/2022    Depression Screen  2023    DTaP/Tdap/Td series (2 - Td or Tdap) 2029    Colorectal Cancer Screening Combo  2030     Immunization History   Administered Date(s) Administered    Influenza Vaccine 2015    Pneumococcal Conjugate (PCV-13) 02/24/2017    Tdap 08/02/2019    Zoster Vaccine, Live 11/13/2018     Physical Exam  Vital signs:   Vitals:    05/04/22 0825   BP: 115/72   Pulse: 74   Resp: 17   Temp: 97.5 °F (36.4 °C)   SpO2: 99%   Weight: 192 lb 3.2 oz (87.2 kg)   Height: 5' 11\" (1.803 m)     Laboratory/Tests:  No visits with results within 3 Month(s) from this visit. Latest known visit with results is:   Hospital Outpatient Visit on 09/15/2021   Component Date Value Ref Range Status    LIPID PROFILE 09/15/2021      Final    Cholesterol, total 09/15/2021 246* <200 mg/dL Final    Triglyceride 09/15/2021 98  <150 mg/dL Final    Comment: The drugs N-acetylcysteine (NAC) and  Metamiszole have been found to cause falsely  low results in this chemical assay. Please  be sure to submit blood samples obtained  BEFORE administration of either of these  drugs to assure correct results.  HDL Cholesterol 09/15/2021 59  40 - 60 mg/dL Final    LDL, calculated 09/15/2021 167.4* 0 - 100 mg/dL Final    VLDL, calculated 09/15/2021 19.6  mg/dL Final    CHOL/HDL Ratio 09/15/2021 4.2  0 - 5.0   Final    Prostate Specific Ag 09/15/2021 1.5  0.0 - 4.0 ng/mL Final    Sodium 09/15/2021 141  135 - 145 mmol/L Final    Potassium 09/15/2021 3.9  3.2 - 5.1 mmol/L Final    Chloride 09/15/2021 103  94 - 111 mmol/L Final    CO2 09/15/2021 27  21 - 33 mmol/L Final    Anion gap 09/15/2021 11  mmol/L Final    Glucose 09/15/2021 95  70 - 110 mg/dL Final    BUN 09/15/2021 16  9 - 21 mg/dL Final    Creatinine 09/15/2021 1.00  0.8 - 1.50 mg/dL Final    BUN/Creatinine ratio 09/15/2021 16    Final    GFR est AA 09/15/2021 >60  ml/min/1.73m2 Final    GFR est non-AA 09/15/2021 >60  ml/min/1.73m2 Final    Comment: Estimated GFR is calculated using the IDMS-traceable Modification of Diet in Renal Disease (MDRD) Study equation, reported for both  Americans (GFRAA) and non- Americans (GFRNA), and normalized to 1.73m2 body surface area.  The physician must decide which value applies to the patient. The MDRD study equation should only be used in individuals age 25 or older. It has not been validated for the following: pregnant women, patients with serious comorbid conditions, or on certain medications, or persons with extremes of body size, muscle mass, or nutritional status.  Calcium 09/15/2021 9.6  8.5 - 10.5 mg/dL Final    Bilirubin, total 09/15/2021 0.6  0.2 - 1.0 mg/dL Final    AST (SGOT) 09/15/2021 17  17 - 74 U/L Final    ALT (SGPT) 09/15/2021 21  3 - 72 U/L Final    Alk. phosphatase 09/15/2021 44  38 - 126 U/L Final    Protein, total 09/15/2021 6.8  6.1 - 8.4 g/dL Final    Albumin 09/15/2021 3.9  3.5 - 4.7 g/dL Final    Globulin 09/15/2021 2.9  g/dL Final    A-G Ratio 09/15/2021 1.3    Final     Patient received Cortisone injection of right foot due to plantar fasciitis by orthopedist, Dr. Justin Pacheco, on 3/16/2022. During examination right heel spur was revealed on x-ray. Patient was directed to follow-up with orthopedist on 4/12/2022. Bilateral hips were injected with cortisone on 3/2/2022 by orthopedist, Dr. Justin Pacheco, due to bilateral hip bursitis. Patient reports extensive left foot surgery and last procedure was 2017. Patient reports history of right ACL tendon repair many years ago. Patient reports he was advised by previous provider to continue his prescribed Atorvastatin 20 mg tablet daily after elevated lipid panel level September 2021. Patient reports he did not start taking the medication as directed and will initiate his Atorvastatin 20 mg tablet daily at this visit. Will reorder lipid panel in future. Physical examination performed:  Bilateral ear tympanic membrane visualized on otoscopic examination revealed no abnormalities. Cardiac auscultation revealed no arrhythmias or murmurs. Bilateral lung sounds clear to auscultation in all fields. Abdomen soft and nontender, bowel sounds normoactive in all 4 quadrants.   There is no observed bilateral lower extremity edema. Assessment/Plan:    1. Erectile dysfunction. Continue Sildenafil 100 mg tablet daily as needed for management of erectile dysfunction. 2.  Trigeminal neuralgia. Continue Carbamazepine 100 mg sustained-release tablet twice daily for management of trigeminal neuralgia. 3.  Mixed hyperlipidemia. Continue Atorvastatin 20 mg tablet daily for management of mixed hyperlipidemia. I have discussed the diagnosis with the patient and the intended plan as seen in the above orders. The patient has received an after-visit summary and questions were answered concerning future plans. I have discussed medication side effects and warnings with the patient as well. I have reviewed the plan of care with the patient, accepted their input and they are in agreement with the treatment goals.        Owen Schroeder NP  May 4, 2022

## 2022-05-25 ENCOUNTER — OFFICE VISIT (OUTPATIENT)
Dept: FAMILY MEDICINE CLINIC | Age: 67
End: 2022-05-25
Payer: MEDICARE

## 2022-05-25 VITALS
BODY MASS INDEX: 27.38 KG/M2 | RESPIRATION RATE: 18 BRPM | HEIGHT: 71 IN | WEIGHT: 195.6 LBS | HEART RATE: 66 BPM | OXYGEN SATURATION: 98 % | SYSTOLIC BLOOD PRESSURE: 120 MMHG | DIASTOLIC BLOOD PRESSURE: 70 MMHG | TEMPERATURE: 97.6 F

## 2022-05-25 DIAGNOSIS — J30.9 ALLERGIC RHINITIS, UNSPECIFIED SEASONALITY, UNSPECIFIED TRIGGER: Primary | ICD-10-CM

## 2022-05-25 PROCEDURE — 1123F ACP DISCUSS/DSCN MKR DOCD: CPT | Performed by: FAMILY MEDICINE

## 2022-05-25 PROCEDURE — 99213 OFFICE O/P EST LOW 20 MIN: CPT | Performed by: FAMILY MEDICINE

## 2022-05-25 RX ORDER — LORATADINE 10 MG/1
10 TABLET ORAL
Qty: 30 TABLET | Refills: 1 | Status: SHIPPED | OUTPATIENT
Start: 2022-05-25 | End: 2022-06-24

## 2022-05-25 RX ORDER — FLUTICASONE PROPIONATE 50 MCG
SPRAY, SUSPENSION (ML) NASAL
Qty: 1 EACH | Refills: 0 | Status: SHIPPED | OUTPATIENT
Start: 2022-05-25

## 2022-05-25 NOTE — PROGRESS NOTES
Acute complaint of head congestion,cough and sinus drainage for 4-5 days. Covid test this morning was negative. States it all started after he was outside digging in dirt doing yard work. Taking Mucinex. Hood Quispe presents today for   Chief Complaint   Patient presents with    Nasal Congestion       Is someone accompanying this pt? no  Is the patient using any DME equipment during OV? no    Depression Screening:  3 most recent PHQ Screens 5/25/2022   Little interest or pleasure in doing things Not at all   Feeling down, depressed, irritable, or hopeless Not at all   Total Score PHQ 2 0       Learning Assessment:  No flowsheet data found. Fall Risk  Fall Risk Assessment, last 12 mths 5/25/2022   Able to walk? Yes   Fall in past 12 months? 0   Do you feel unsteady? 0   Are you worried about falling 0       ADL  ADL Assessment 5/25/2022   Feeding yourself No Help Needed   Getting from bed to chair No Help Needed   Getting dressed No Help Needed   Bathing or showering No Help Needed   Walk across the room (includes cane/walker) No Help Needed   Using the telphone No Help Needed   Taking your medications No Help Needed   Preparing meals No Help Needed   Managing money (expenses/bills) No Help Needed   Moderately strenuous housework (laundry) No Help Needed   Shopping for personal items (toiletries/medicines) No Help Needed   Shopping for groceries No Help Needed   Driving No Help Needed   Climbing a flight of stairs No Help Needed   Getting to places beyond walking distances No Help Needed       Health Maintenance reviewed and discussed and ordered per Provider. Health Maintenance Due   Topic Date Due    Hepatitis C Screening  Never done    COVID-19 Vaccine (1) Never done    Shingrix Vaccine Age 50> (1 of 2) Never done    AAA Screening 73-67 YO Male Smoking Patients  Never done    Pneumococcal 65+ years (2 - PPSV23 or PCV20) 01/24/2022   . Coordination of Care:  1.  \"Have you been to the ER, urgent care clinic since your last visit? Hospitalized since your last visit? \" No    2. \"Have you seen or consulted any other health care providers outside of the [address] [place] [address] since your last visit? \" No

## 2022-05-25 NOTE — PROGRESS NOTES
Sushil Lim is a 77 y.o. male who presents to the office today for the following:  Chief Complaint   Patient presents with    Nasal Congestion       No Known Allergies    Current Outpatient Medications   Medication Sig    fluticasone propionate (FLONASE) 50 mcg/actuation nasal spray 2 sprays in each nostril daily  Indications: inflammation of the nose due to an allergy    loratadine (CLARITIN) 10 mg tablet Take 1 Tablet by mouth daily as needed for Allergies for up to 30 days. Indications: inflammation of the nose due to an allergy    famciclovir (FAMVIR) 500 mg tablet TAKE 1 TABLET BY MOUTH THREE TIMES DAILY    carBAMazepine XR (TEGretol XR) 100 mg SR tablet Take 1 Tablet by mouth two (2) times a day.  atorvastatin (LIPITOR) 20 mg tablet Take 1 Tablet by mouth daily.  sildenafil citrate (VIAGRA) 100 mg tablet Take 1 Tablet by mouth as needed for Erectile Dysfunction.  meloxicam (MOBIC) 15 mg tablet Take 1 Tab by mouth daily.  azelastine (ASTEPRO) 0.15 % (205.5 mcg) two (2) times a day.  bupivacaine, PF, (MARCAINE) 0.5 % (5 mg/mL) injection once.  EPINEPHrine (EpiPen) 0.3 mg/0.3 mL injection 0.3 mg by IntraMUSCular route once as needed for Allergic Response.  indomethacin (INDOCIN) 50 mg capsule Take  by mouth three (3) times daily.  lysine (L-LYSINE) 500 mg tab tablet Take 1,000 mg by mouth daily.  ascorbic acid, vitamin C, (VITAMIN C) 500 mg tablet Take 500 mg by mouth daily.  loperamide (IMODIUM A-D) 2 mg tablet Take 4 mg by mouth daily. No current facility-administered medications for this visit.        Past Medical History:   Diagnosis Date    Acute sinusitis 7/22/2020    Allergic rhinitis 7/22/2020    Arthritis     Cancer ([facility] [address]     skin    Contusion of chest 7/22/2020    Erectile dysfunction 7/22/2020    Hyperlipidemia 7/22/2020    Sleep apnea     does have to use cpap    Trigeminal neuralgia 7/22/2020       Past Surgical History:   Procedure Laterality Date    HX ACL RECONSTRUCTION      right knee     HX HEENT      UPPP    HX HEENT      sinus surgery     HX HERNIA REPAIR      umbilical    HX ORTHOPAEDIC  1964    left leg tendon lengthing     HX ORTHOPAEDIC  1979    right ankle     HX ORTHOPAEDIC      left shoulder rotator cuff x 2     HX ORTHOPAEDIC      right shoulder x 2     HX ORTHOPAEDIC      right elbow tendon repair     HX ORTHOPAEDIC  2017    right ankle     HX TONSILLECTOMY         Social History     Socioeconomic History    Marital status:      Spouse name: Not on file    Number of children: Not on file    Years of education: Not on file    Highest education level: Not on file   Occupational History    Not on file   Tobacco Use    Smoking status: Former Smoker     Packs/day: 1.00     Years: 15.00     Pack years: 15.00     Quit date:      Years since quittin.4    Smokeless tobacco: Former User   Vaping Use    Vaping Use: Never used   Substance and Sexual Activity    Alcohol use: Yes     Alcohol/week: 12.0 standard drinks     Types: 12 Cans of beer per week    Drug use: Never    Sexual activity: Not Currently   Other Topics Concern    Not on file   Social History Narrative    Not on file     Social Determinants of Health     Financial Resource Strain:     Difficulty of Paying Living Expenses: Not on file   Food Insecurity:     Worried About Running Out of Food in the Last Year: Not on file    Katarzyna of Food in the Last Year: Not on file   Transportation Needs:     Lack of Transportation (Medical): Not on file    Lack of Transportation (Non-Medical):  Not on file   Physical Activity:     Days of Exercise per Week: Not on file    Minutes of Exercise per Session: Not on file   Stress:     Feeling of Stress : Not on file   Social Connections:     Frequency of Communication with Friends and Family: Not on file    Frequency of Social Gatherings with Friends and Family: Not on file    Attends Holiness Services: Not on file    Active Member of Clubs or Organizations: Not on file    Attends Club or Organization Meetings: Not on file    Marital Status: Not on file   Intimate Partner Violence:     Fear of Current or Ex-Partner: Not on file    Emotionally Abused: Not on file    Physically Abused: Not on file    Sexually Abused: Not on file   Housing Stability:     Unable to Pay for Housing in the Last Year: Not on file    Number of [place] in the Last Year: Not on file    Unstable Housing in the Last Year: Not on file     or  Social History     Tobacco Use   Smoking Status Former Smoker    Packs/day: 1.00    Years: 15.00    Pack years: 15.00    Quit date:     Years since quittin.4   Smokeless Tobacco Former User       Family History   Problem Relation Age of Onset    No Known Problems Mother     No Known Problems Father          HPI:  Patient notes that he was digging in his backyard when he developed suddenly runny nose and some postnasal drip and eventually he feels like he is coughing. Denies any fevers or chills. Tested negative for COVID at home. Denies any other symptoms. Denies any cough with sputum. Nasal Congestion   Associated symptoms include congestion and cough. Pertinent negatives include no sore throat and no shortness of breath. ROS:  Review of Systems   Constitutional: Negative. HENT: Positive for congestion. Negative for sore throat. Respiratory: Positive for cough. Negative for sputum production and shortness of breath. Cardiovascular: Negative. All other systems reviewed and are negative. Physical Exam:  Visit Vitals  /70   Pulse 66   Temp 97.6 °F (36.4 °C)   Resp 18   Ht 5' 11\" (1.803 m)   Wt 195 lb 9.6 oz (88.7 kg)   SpO2 98%   BMI 27.28 kg/m²     Physical Exam  Vitals and nursing note reviewed. Constitutional:       Appearance: Normal appearance. HENT:      Nose: Congestion present.       Mouth/Throat:      Mouth: Mucous membranes are dry. Eyes:      Extraocular Movements: Extraocular movements intact. Conjunctiva/sclera: Conjunctivae normal.      Pupils: Pupils are equal, round, and reactive to light. Cardiovascular:      Rate and Rhythm: Normal rate and regular rhythm. Pulmonary:      Effort: Pulmonary effort is normal.      Breath sounds: Normal breath sounds. Neurological:      Mental Status: He is alert. Assessment/Plan:    Orders Placed This Encounter    fluticasone propionate (FLONASE) 50 mcg/actuation nasal spray     Si sprays in each nostril daily  Indications: inflammation of the nose due to an allergy     Dispense:  1 Each     Refill:  0    loratadine (CLARITIN) 10 mg tablet     Sig: Take 1 Tablet by mouth daily as needed for Allergies for up to 30 days. Indications: inflammation of the nose due to an allergy     Dispense:  30 Tablet     Refill:  1     or  1. Allergic rhinitis, unspecified seasonality, unspecified trigger  Recommend Flonase and loratadine. Follow-up as needed. Avoid triggers. Use nasal saline rinses at home Twice a day. - fluticasone propionate (FLONASE) 50 mcg/actuation nasal spray; 2 sprays in each nostril daily  Indications: inflammation of the nose due to an allergy  Dispense: 1 Each; Refill: 0  - loratadine (CLARITIN) 10 mg tablet; Take 1 Tablet by mouth daily as needed for Allergies for up to 30 days. Indications: inflammation of the nose due to an allergy  Dispense: 30 Tablet; Refill: 1        Follow-up and Dispositions    · Return if symptoms worsen or fail to improve, for allergic rhinitis.            Olivier Manzano MD

## 2022-06-15 ENCOUNTER — TRANSCRIBE ORDER (OUTPATIENT)
Dept: SCHEDULING | Age: 67
End: 2022-06-15

## 2022-06-15 DIAGNOSIS — M54.81 OCCIPITAL NEURALGIA: Primary | ICD-10-CM

## 2022-07-01 ENCOUNTER — HOSPITAL ENCOUNTER (OUTPATIENT)
Dept: LAB | Age: 67
Discharge: HOME | End: 2022-07-01
Payer: MEDICARE

## 2022-07-01 ENCOUNTER — HOSPITAL ENCOUNTER (OUTPATIENT)
Dept: MRI IMAGING | Age: 67
Discharge: HOME | End: 2022-07-01
Payer: MEDICARE

## 2022-07-01 ENCOUNTER — APPOINTMENT (OUTPATIENT)
Dept: MRI IMAGING | Age: 67
End: 2022-07-01
Payer: MEDICARE

## 2022-07-01 ENCOUNTER — TRANSCRIBE ORDER (OUTPATIENT)
Dept: REGISTRATION | Age: 67
End: 2022-07-01

## 2022-07-01 DIAGNOSIS — M54.81 OCCIPITAL NEURALGIA: Primary | ICD-10-CM

## 2022-07-01 DIAGNOSIS — M54.81 OCCIPITAL NEURALGIA: ICD-10-CM

## 2022-07-01 LAB — CREAT SERPL-MCNC: 0.9 MG/DL (ref 0.6–1.3)

## 2022-07-01 PROCEDURE — 36415 COLL VENOUS BLD VENIPUNCTURE: CPT

## 2022-07-01 PROCEDURE — 82565 ASSAY OF CREATININE: CPT

## 2022-12-01 DIAGNOSIS — M15.9 PRIMARY OSTEOARTHRITIS INVOLVING MULTIPLE JOINTS: ICD-10-CM

## 2022-12-01 NOTE — THERAPY DISCHARGE
[facility] PHYSICAL THERAPY  [address] [place] [address] Dr GURROLA, [address] [place] [address], Formerly [facility] * Phone: [phone] * Fax: [phone]  [facility] PHYSICAL THERAPY            Patient Name: Dimitry Siddiqi : 1955   Treatment/Medical Diagnosis: Pain in right hip [M25.551]   Onset Date: 2022    Referral Source: Marybeth Lacey MD [facility]): 3/14/22   Prior Hospitalization: See Medical History Provider #: [id number]   Prior Level of Function: Independent   Comorbidities: Arthritis, Cancer, ED, HLD, Sleep apnea, Trigeminal neuralgia, R ACLR (),, Hernia repair, R ankle sx (, ), L RTR x 2, R shoulder arthroscopy, R elbow tendon repair   Medications: Verified on Patient Summary List   Visits from [facility]: 2 Missed Visits: 0       Subjective:  Pt states that he was working all morning removing trees and reports soreness in LE. (per last attended session on 3/17/22)    Objective:  Palpation: tenderness noted along bilateral greater trochanters, left > right; left iliac crest inferior to right in standing; intact to light touch                                         ROM/Strength                   AROM                          PROM                       Strength (1-5)  Hip Left Right Left Right Left Right   Flexion WFL* WFL*     5/5 5/5   Extension [facility] WFL     4+/5 4+/5*   Abduction WFL WFL     5/5 5/5   Adduction               ER               IR               Knee Left Right Left Right Left Right   [facility]     5/5 5/5   Flexion [facility] WFL     5/5 5/5               *indicative of pain                Flexibility:   Hip Flexor:  (L) Tightness= [] WNL   [] Min   [] Mod   [] Severe                       (R) Tightness= [] WNL   [] Min   [] Mod   [] Severe  Hamstrings:               (L) Tightness= [] WNL   [] Min   [] Mod   [] Severe                       (R) Tightness= [] WNL   [] Min   [] Mod   [] Severe  Quadriceps:               (L) Tightness= [] WNL   [] Min   [] Mod   [] Severe                       (R) Tightness= [] WNL   [] Min   [] Mod   [] Severe  Gastroc:               (L) Tightness= [] WNL   [] Min   [x] Mod   [] Severe                       (R) Tightness= [] WNL   [] Min   [] Mod   [] Severe                                                                                            Optional Tests  Luis Daniel/ Trini Test:    [] Neg    [x] Pos  Jarocho Test:              [] Neg    [] Pos  Scouring Test:             [] Neg    [x] Pos  Trendelenberg:           [] Neg    [x] Pos         [x] Left    [] Right  OberTest:                    [x] Neg    [] Pos  Ely's Test:                    [] Neg    [] Pos  Piriformis Test:            [] Neg    [x] Pos  Sub-talor alignment:    [] Neurtral      [] Pronation    [] Supination  Forefoot alignment:     [] Neutral       [] Varus          [] Valgus  Functional Leg Length: right > left         Gait:                [x] Normal    [] Abnormal    [] Antalgic    [] NWB    Device: none                          Describe: trendelenburg observed, increased lumbar sidebend to left      Other tests/ comments: FOTO: 68, LEFS: 59.8                 Short Term Goals:  Pt will be independent with HEP to improve hip strength & ROM in 3 weeks. MET. Pt will demonstrate pain-free hip AROM in all planes for improved ADL efficiency in 3 weeks. Not Met. Pt will report no greater than 6/10 pain in R hip with daily activity in 3 weeks. Not Met. Long Term Goals:  Pt will improve B hip extensor strength to 5/5 for improved stability standing task in 6 weeks. Not Met. Pt will improve LEFS >60 for improved function & quality of life in 6 weeks. Not Met. Pt will report no greater than 1-2/10 pain in hip with daily activity and improved sleep at night in 6 weeks. Not Met. Key Functional Changes/Progress: None measurable, pt did not return for PT after his vacation.      Assessments/Recommendations: Discontinue therapy due to lack of attendance or compliance. If you have any questions/comments please contact us directly at [phone]. Thank you for allowing us to assist in the care of your patient. Therapist Signature: Bhupinder Valadez PT, DPT Date: 12/1/2022   Reporting Period: 3/14/22 - 3/17/22 Time: 9:66 PM      Certification Period: 3/14/22 - 6/12/22       NOTE TO PHYSICIAN:  PLEASE COMPLETE THE ORDERS BELOW AND FAX TO   [facility]: [phone]. If you are unable to process this request in 24 hours please contact our office: [phone].    ___ I have read the above report and request that my patient be discharged from therapy.      Physician Signature:        Date:       Time:

## 2022-12-04 RX ORDER — MELOXICAM 15 MG/1
15 TABLET ORAL DAILY
Qty: 90 TABLET | Refills: 1 | Status: SHIPPED | OUTPATIENT
Start: 2022-12-04

## 2023-01-18 ENCOUNTER — OFFICE VISIT (OUTPATIENT)
Dept: FAMILY MEDICINE CLINIC | Age: 68
End: 2023-01-18
Payer: MEDICARE

## 2023-01-18 VITALS
HEART RATE: 67 BPM | WEIGHT: 195.4 LBS | SYSTOLIC BLOOD PRESSURE: 117 MMHG | TEMPERATURE: 98.6 F | HEIGHT: 71 IN | BODY MASS INDEX: 27.35 KG/M2 | OXYGEN SATURATION: 97 % | DIASTOLIC BLOOD PRESSURE: 72 MMHG | RESPIRATION RATE: 18 BRPM

## 2023-01-18 DIAGNOSIS — Z13.1 DIABETES MELLITUS SCREENING: ICD-10-CM

## 2023-01-18 DIAGNOSIS — M47.816 LUMBAR SPONDYLOSIS: ICD-10-CM

## 2023-01-18 DIAGNOSIS — N52.9 ERECTILE DYSFUNCTION, UNSPECIFIED ERECTILE DYSFUNCTION TYPE: ICD-10-CM

## 2023-01-18 DIAGNOSIS — Z13.31 SCREENING FOR DEPRESSION: ICD-10-CM

## 2023-01-18 DIAGNOSIS — Z12.5 ENCOUNTER FOR SCREENING FOR MALIGNANT NEOPLASM OF PROSTATE: ICD-10-CM

## 2023-01-18 DIAGNOSIS — M15.9 PRIMARY OSTEOARTHRITIS INVOLVING MULTIPLE JOINTS: ICD-10-CM

## 2023-01-18 DIAGNOSIS — R68.89 OTHER GENERAL SYMPTOMS AND SIGNS: ICD-10-CM

## 2023-01-18 DIAGNOSIS — Z11.59 NEED FOR HEPATITIS C SCREENING TEST: ICD-10-CM

## 2023-01-18 DIAGNOSIS — E78.01 FAMILIAL HYPERCHOLESTEROLEMIA: ICD-10-CM

## 2023-01-18 DIAGNOSIS — H91.93 BILATERAL HEARING LOSS, UNSPECIFIED HEARING LOSS TYPE: ICD-10-CM

## 2023-01-18 DIAGNOSIS — M54.50 LUMBAR BACK PAIN: ICD-10-CM

## 2023-01-18 DIAGNOSIS — Z13.39 SCREENING FOR ALCOHOLISM: ICD-10-CM

## 2023-01-18 DIAGNOSIS — R79.9 ABNORMAL FINDING OF BLOOD CHEMISTRY, UNSPECIFIED: ICD-10-CM

## 2023-01-18 DIAGNOSIS — Z00.00 MEDICARE ANNUAL WELLNESS VISIT, SUBSEQUENT: Primary | ICD-10-CM

## 2023-01-18 PROCEDURE — 3017F COLORECTAL CA SCREEN DOC REV: CPT | Performed by: NURSE PRACTITIONER

## 2023-01-18 PROCEDURE — 99214 OFFICE O/P EST MOD 30 MIN: CPT | Performed by: NURSE PRACTITIONER

## 2023-01-18 PROCEDURE — 1123F ACP DISCUSS/DSCN MKR DOCD: CPT | Performed by: NURSE PRACTITIONER

## 2023-01-18 PROCEDURE — G8427 DOCREV CUR MEDS BY ELIG CLIN: HCPCS | Performed by: NURSE PRACTITIONER

## 2023-01-18 PROCEDURE — G0439 PPPS, SUBSEQ VISIT: HCPCS | Performed by: NURSE PRACTITIONER

## 2023-01-18 PROCEDURE — 1101F PT FALLS ASSESS-DOCD LE1/YR: CPT | Performed by: NURSE PRACTITIONER

## 2023-01-18 PROCEDURE — G8417 CALC BMI ABV UP PARAM F/U: HCPCS | Performed by: NURSE PRACTITIONER

## 2023-01-18 PROCEDURE — G8432 DEP SCR NOT DOC, RNG: HCPCS | Performed by: NURSE PRACTITIONER

## 2023-01-18 PROCEDURE — G8536 NO DOC ELDER MAL SCRN: HCPCS | Performed by: NURSE PRACTITIONER

## 2023-01-18 NOTE — PATIENT INSTRUCTIONS
Medicare Wellness Visit, Male    The best way to live healthy is to have a lifestyle where you eat a well-balanced diet, exercise regularly, limit alcohol use, and quit all forms of tobacco/nicotine, if applicable. Regular preventive services are another way to keep healthy. Preventive services (vaccines, screening tests, monitoring & exams) can help personalize your care plan, which helps you manage your own care. Screening tests can find health problems at the earliest stages, when they are easiest to treat. [facility] follows the current, evidence-based guidelines published by the [facility] ([facility]) when recommending preventive services for our patients. Because we follow these guidelines, sometimes recommendations change over time as research supports it. (For example, a prostate screening blood test is no longer routinely recommended for men with no symptoms). Of course, you and your doctor may decide to screen more often for some diseases, based on your risk and co-morbidities (chronic disease you are already diagnosed with). Preventive services for you include:  - Medicare offers their members a free annual wellness visit, which is time for you and your primary care provider to discuss and plan for your preventive service needs.  Take advantage of this benefit every year!    -Over the age of 72 should receive the recommended pneumonia vaccines.   -All adults should have a flu vaccine yearly.  -All adults should receive a tetanus vaccine every 10 years.  -Over the age of 48 should receive the shingles vaccines.    -All adults should be screened once for Hepatitis C.  -All adults age 38-68 who are overweight should have a diabetes screening test once every three years.   -Other screening tests & preventive services for persons with diabetes include: an eye exam to screen for diabetic retinopathy, a kidney function test, a foot exam, and stricter control over your cholesterol.   -Cardiovascular screening for adults with routine risk involves an electrocardiogram (ECG) at intervals determined by the provider.     -Colorectal cancer screening should be done for adults age 43-69 with no increased risk factors for colorectal cancer. There are a number of acceptable methods of screening for this type of cancer. Each test has its own benefits and drawbacks. Discuss with your provider what is most appropriate for you during your annual wellness visit. The different tests include: colonoscopy (considered the best screening method), a fecal occult blood test, a fecal DNA test, and sigmoidoscopy.    -Lung cancer screening is recommended annually with a low dose CT scan for adults between age 54 and 68, who have smoked at least 30 pack years (equivalent of 1 pack per day for 30 days), and who is a current smoker or quit less than 15 years ago. -An Abdominal Aortic Aneurysm (AAA) Screening is recommended for men age 73-68 who has ever smoked in their lifetime.      Here is a list of your current Health Maintenance items (your personalized list of preventive services) with a due date:  Health Maintenance Due   Topic Date Due    Hepatitis C Test  Never done    COVID-19 Vaccine (1) Never done    Shingles Vaccine (1 of 2) Never done    AAA Screening  Never done    Pneumococcal Vaccine (3) 01/24/2022    Yearly Flu Vaccine (1) 08/01/2022    Annual Well Visit  09/15/2022    Cholesterol Test   09/15/2022

## 2023-01-18 NOTE — PROGRESS NOTES
History of Present Illness  Mariaa Denton is a 79 y.o. male who presents today for:    Chief Complaint   Patient presents with    Follow-up    Annual Wellness Visit     Chair, banana, sunrise     Past Medical History  Past Medical History:   Diagnosis Date    Acute sinusitis 7/22/2020    Allergic rhinitis 7/22/2020    Arthritis     Cancer ([address]     skin    Contusion of chest 7/22/2020    Erectile dysfunction 7/22/2020    Hyperlipidemia 7/22/2020    Sleep apnea     does have to use cpap    Trigeminal neuralgia 7/22/2020        Surgical History  Past Surgical History:   Procedure Laterality Date    HX ACL RECONSTRUCTION  2000    right knee     HX HEENT  1996    UPPP    HX HEENT      sinus surgery     HX HERNIA REPAIR      umbilical    HX ORTHOPAEDIC  09/1964    left leg tendon lengthing     HX ORTHOPAEDIC  1979    right ankle     HX ORTHOPAEDIC      left shoulder rotator cuff x 2     HX ORTHOPAEDIC      right shoulder x 2     HX ORTHOPAEDIC      right elbow tendon repair     HX ORTHOPAEDIC  2017    right ankle     HX TONSILLECTOMY  1996        Current Medications  Current Outpatient Medications   Medication Sig    meloxicam (MOBIC) 15 mg tablet Take 1 Tablet by mouth daily. fluticasone propionate (FLONASE) 50 mcg/actuation nasal spray 2 sprays in each nostril daily  Indications: inflammation of the nose due to an allergy    famciclovir (FAMVIR) 500 mg tablet TAKE 1 TABLET BY MOUTH THREE TIMES DAILY    carBAMazepine XR (TEGretol XR) 100 mg SR tablet Take 1 Tablet by mouth two (2) times a day. sildenafil citrate (VIAGRA) 100 mg tablet Take 1 Tablet by mouth as needed for Erectile Dysfunction. azelastine (ASTEPRO) 0.15 % (205.5 mcg) two (2) times a day. lysine (L-LYSINE) 500 mg tab tablet Take 1,000 mg by mouth daily. ascorbic acid, vitamin C, (VITAMIN C) 500 mg tablet Take 500 mg by mouth daily. loperamide (IMMODIUM) 2 mg tablet Take 4 mg by mouth daily.     atorvastatin (LIPITOR) 20 mg tablet Take 1 Tablet by mouth daily. (Patient not taking: Reported on 2023)    bupivacaine, PF, (MARCAINE) 0.5 % (5 mg/mL) injection once. (Patient not taking: Reported on 2023)    EPINEPHrine (EPIPEN) 0.3 mg/0.3 mL injection 0.3 mg by IntraMUSCular route once as needed for Allergic Response. (Patient not taking: Reported on 2023)     No current facility-administered medications for this visit. Allergies/Drug Reactions  No Known Allergies     Family History  Family History   Problem Relation Age of Onset    No Known Problems Mother     No Known Problems Father         Social History  Social History     Tobacco Use    Smoking status: Former     Packs/day: 1.00     Years: 15.00     Pack years: 15.00     Types: Cigarettes     Quit date:      Years since quittin.0    Smokeless tobacco: Former   Vaping Use    Vaping Use: Never used   Substance Use Topics    Alcohol use:  Yes     Alcohol/week: 12.0 standard drinks     Types: 12 Cans of beer per week    Drug use: Never        Health Maintenance   Topic Date Due    Hepatitis C Screening  Never done    Shingles Vaccine (1 of 2) Never done    AAA Screening 73-69 YO Male Smoking Patients  Never done    Pneumococcal 65+ years (3) 2022    COVID-19 Vaccine (4 - Booster for Moderna series) 2022    Medicare Yearly Exam  09/15/2022    Lipid Screen  09/15/2022    Depression Screen  2024    DTaP/Tdap/Td series (2 - Td or Tdap) 2029    Colorectal Cancer Screening Combo  2030    Flu Vaccine  Completed     Immunization History   Administered Date(s) Administered    COVID-19, MODERNA BLUE border, Primary or Immunocompromised, (age 18y+), IM, 100 mcg/0.5mL 2021, 2021    COVID-19, MODERNA Booster BLUE border, (age 18y+), IM, 50mcg/0.25mL 2021    Influenza High Dose Vaccine PF 2022    Influenza Vaccine 2015    Influenza, FLUCELVAX, (age 10 mo+), MDCK, PF 2019    Pneumococcal Conjugate (PCV-13) 2017 Pneumococcal Polysaccharide (PPSV-23) 01/24/2017    Tdap 08/02/2019    Zoster Vaccine, Live 11/13/2018     Physical Exam  Vital signs:   Vitals:    01/18/23 1620   BP: 117/72   Pulse: 67   Resp: 18   Temp: 98.6 °F (37 °C)   TempSrc: Temporal   SpO2: 97%   Weight: 195 lb 6.4 oz (88.6 kg)   Height: 5' 11\" (1.803 m)     Laboratory/Tests:  No visits with results within 3 Month(s) from this visit. Latest known visit with results is:   Hospital Outpatient Visit on 07/01/2022   Component Date Value Ref Range Status    Creatinine 07/01/2022 0.90  0.60 - 1.30 mg/dL Final     Patient reports no pain or discomfort at this time. Patient reports the was informed of bilateral ear hearing loss some years ago by audiologist.  He has yet to follow up with audiology and will refer patient to audiology at this visit. Reports he has not taken his prescribed Atorvastatin 20 mg tablet and greater than a year. Will order Lipid panel at this visit. Call patient with laboratory results. Assessment/Plan:    Medicare Wellness. Performed Medicare Wellness Subsequent examination. Lumbar spondylosis and lumbar back pain. Meloxicam 15 mg tablet daily for management of lumbar spondylosis and lumbar back pain. Mixed hyperlipidemia. Ordered Lipid panel. I have discussed the diagnosis with the patient and the intended plan as seen in the above orders. The patient has received an after-visit summary and questions were answered concerning future plans. I have discussed medication side effects and warnings with the patient as well. I have reviewed the plan of care with the patient, accepted their input and they are in agreement with the treatment goals.        Marysol Rice NP  January 18, 2023      This is the Subsequent Medicare Annual Wellness Exam, performed 12 months or more after the Initial AWV or the last Subsequent AWV    I have reviewed the patient's medical history in detail and updated the computerized patient record. Assessment/Plan   Education and counseling provided:  Are appropriate based on today's review and evaluation  End-of-Life planning (with patient's consent)    1. Medicare annual wellness visit, subsequent  2. Screening for alcoholism  -     AK ANNUAL ALCOHOL SCREEN 15 MIN  3. Screening for depression  -     DEPRESSION SCREEN ANNUAL  -     CBC WITH AUTOMATED DIFF; Future  -     METABOLIC PANEL, COMPREHENSIVE; Future  4. Familial hypercholesterolemia  -     LIPID PANEL; Future  5. Primary osteoarthritis involving multiple joints  6. Erectile dysfunction, unspecified erectile dysfunction type  -     PSA SCREENING (SCREENING); Future  7. Diabetes mellitus screening  -     HEMOGLOBIN A1C WITH EAG; Future  8. Other general symptoms and signs  -     TSH 3RD GENERATION; Future  -     VITAMIN B12 & FOLATE; Future  9. Abnormal finding of blood chemistry, unspecified   -     HEMOGLOBIN A1C WITH EAG; Future  10. Encounter for screening for malignant neoplasm of prostate   -     PSA SCREENING (SCREENING); Future  11. Need for hepatitis C screening test  -     HEPATITIS C AB; Future  12. Bilateral hearing loss, unspecified hearing loss type  -     REFERRAL TO AUDIOLOGY; Future  13. Lumbar spondylosis  14. Lumbar back pain       Depression Risk Factor Screening     3 most recent PHQ Screens 1/18/2023   Little interest or pleasure in doing things Not at all   Feeling down, depressed, irritable, or hopeless Not at all   Total Score PHQ 2 0       Alcohol & Drug Abuse Risk Screen    Do you average more than 1 drink per night or more than 7 drinks a week: No    In the past three months have you have had more than 4 drinks containing alcohol on one occasion: No          Functional Ability and Level of Safety    Hearing: The patient needs further evaluation. Activities of Daily Living: The home contains: no safety equipment.   Patient does total self care      Ambulation: with no difficulty     Fall Risk:  Fall Risk Assessment, last 12 mths 1/18/2023   Able to walk? Yes   Fall in past 12 months? 1   Do you feel unsteady? 0   Are you worried about falling 0   Is TUG test greater than 12 seconds? 0   Is the gait abnormal? 0   Number of falls in past 12 months 1   Fall with injury?  0      Abuse Screen:  Patient is not abused       Cognitive Screening    Has your family/caregiver stated any concerns about your memory: no     Cognitive Screening: Normal - Clock Drawing Test    Health Maintenance Due     Health Maintenance Due   Topic Date Due    Hepatitis C Screening  Never done    Shingles Vaccine (1 of 2) Never done    AAA Screening 73-67 YO Male Smoking Patients  Never done    Pneumococcal 65+ years (3) 01/24/2022    COVID-19 Vaccine (4 - Booster for Elizabeth Govern series) 02/08/2022    Medicare Yearly Exam  09/15/2022    Lipid Screen  09/15/2022       Patient Care Team   Patient Care Team:  Ally Arias NP as PCP - General (Nurse Practitioner)  Ally Arias NP as PCP - [facility]  Braulio Pardo MD (Orthopedic Surgery)    History     Patient Active Problem List   Diagnosis Code    Acute sinusitis J01.90    Allergic rhinitis J30.9    Contusion of chest S20.219A    Hyperlipidemia E78.5    Erectile dysfunction N52.9    Trigeminal neuralgia G50.0    Close exposure to COVID-19 virus Z20.822     Past Medical History:   Diagnosis Date    Acute sinusitis 7/22/2020    Allergic rhinitis 7/22/2020    Arthritis     Cancer ([address]     skin    Contusion of chest 7/22/2020    Erectile dysfunction 7/22/2020    Hyperlipidemia 7/22/2020    Sleep apnea     does have to use cpap    Trigeminal neuralgia 7/22/2020      Past Surgical History:   Procedure Laterality Date    HX ACL RECONSTRUCTION  2000    right knee     HX HEENT  1996    UPPP    HX HEENT      sinus surgery     HX HERNIA REPAIR      umbilical    HX ORTHOPAEDIC  09/1964    left leg tendon lengthing     HX ORTHOPAEDIC  1979    right ankle     HX ORTHOPAEDIC left shoulder rotator cuff x 2     HX ORTHOPAEDIC      right shoulder x 2     HX ORTHOPAEDIC      right elbow tendon repair     HX ORTHOPAEDIC  2017    right ankle     HX TONSILLECTOMY       Current Outpatient Medications   Medication Sig Dispense Refill    meloxicam (MOBIC) 15 mg tablet Take 1 Tablet by mouth daily. 90 Tablet 1    fluticasone propionate (FLONASE) 50 mcg/actuation nasal spray 2 sprays in each nostril daily  Indications: inflammation of the nose due to an allergy 1 Each 0    famciclovir (FAMVIR) 500 mg tablet TAKE 1 TABLET BY MOUTH THREE TIMES DAILY 60 Tablet 0    carBAMazepine XR (TEGretol XR) 100 mg SR tablet Take 1 Tablet by mouth two (2) times a day. 60 Tablet 2    sildenafil citrate (VIAGRA) 100 mg tablet Take 1 Tablet by mouth as needed for Erectile Dysfunction. 8 Tablet 5    azelastine (ASTEPRO) 0.15 % (205.5 mcg) two (2) times a day. lysine (L-LYSINE) 500 mg tab tablet Take 1,000 mg by mouth daily. ascorbic acid, vitamin C, (VITAMIN C) 500 mg tablet Take 500 mg by mouth daily. loperamide (IMMODIUM) 2 mg tablet Take 4 mg by mouth daily. atorvastatin (LIPITOR) 20 mg tablet Take 1 Tablet by mouth daily. (Patient not taking: Reported on 2023) 30 Tablet 1    bupivacaine, PF, (MARCAINE) 0.5 % (5 mg/mL) injection once. (Patient not taking: Reported on 2023)      EPINEPHrine (EPIPEN) 0.3 mg/0.3 mL injection 0.3 mg by IntraMUSCular route once as needed for Allergic Response. (Patient not taking: Reported on 2023)       No Known Allergies    Family History   Problem Relation Age of Onset    No Known Problems Mother     No Known Problems Father      Social History     Tobacco Use    Smoking status: Former     Packs/day: 1.00     Years: 15.00     Pack years: 15.00     Types: Cigarettes     Quit date:      Years since quittin.0    Smokeless tobacco: Former   Substance Use Topics    Alcohol use:  Yes     Alcohol/week: 12.0 standard drinks     Types: 12 Cans of beer per week         Colleen Rader, NP

## 2023-01-20 ENCOUNTER — HOSPITAL ENCOUNTER (OUTPATIENT)
Dept: LAB | Age: 68
End: 2023-01-20
Payer: MEDICARE

## 2023-01-20 LAB
ALBUMIN SERPL-MCNC: 3.7 G/DL (ref 3.4–5)
ALBUMIN/GLOB SERPL: 1.1 (ref 0.8–1.7)
ALP SERPL-CCNC: 55 U/L (ref 45–117)
ALT SERPL-CCNC: 34 U/L (ref 16–61)
ANION GAP SERPL CALC-SCNC: 6 MMOL/L (ref 3–18)
AST SERPL W P-5'-P-CCNC: 22 U/L (ref 10–38)
BASOPHILS # BLD: 0 K/UL (ref 0–0.1)
BASOPHILS NFR BLD: 1 % (ref 0–2)
BILIRUB SERPL-MCNC: 0.3 MG/DL (ref 0.2–1)
BUN SERPL-MCNC: 16 MG/DL (ref 7–18)
BUN/CREAT SERPL: 18 (ref 12–20)
CA-I BLD-MCNC: 9 MG/DL (ref 8.5–10.1)
CHLORIDE SERPL-SCNC: 106 MMOL/L (ref 100–111)
CHOLEST SERPL-MCNC: 222 MG/DL
CO2 SERPL-SCNC: 29 MMOL/L (ref 21–32)
CREAT SERPL-MCNC: 0.88 MG/DL (ref 0.6–1.3)
DIFFERENTIAL METHOD BLD: ABNORMAL
EOSINOPHIL # BLD: 0.1 K/UL (ref 0–0.4)
EOSINOPHIL NFR BLD: 2 % (ref 0–5)
ERYTHROCYTE [DISTWIDTH] IN BLOOD BY AUTOMATED COUNT: 12.5 % (ref 11.6–14.5)
EST. AVERAGE GLUCOSE BLD GHB EST-MCNC: 103 MG/DL
FOLATE SERPL-MCNC: 14 NG/ML (ref 3.1–17.5)
GLOBULIN SER CALC-MCNC: 3.4 G/DL (ref 2–4)
GLUCOSE SERPL-MCNC: 96 MG/DL (ref 74–99)
HBA1C MFR BLD: 5.2 % (ref 4.2–5.6)
HCT VFR BLD AUTO: 42.7 % (ref 36–48)
HDLC SERPL-MCNC: 66 MG/DL (ref 40–60)
HDLC SERPL: 3.4 (ref 0–5)
HGB BLD-MCNC: 13.6 G/DL (ref 13–16)
IMM GRANULOCYTES # BLD AUTO: 0 K/UL (ref 0–0.04)
IMM GRANULOCYTES NFR BLD AUTO: 0 % (ref 0–0.5)
LDLC SERPL CALC-MCNC: 142.8 MG/DL (ref 0–100)
LIPID PROFILE,FLP: ABNORMAL
LYMPHOCYTES # BLD: 1.6 K/UL (ref 0.9–3.6)
LYMPHOCYTES NFR BLD: 35 % (ref 21–52)
MCH RBC QN AUTO: 30.3 PG (ref 24–34)
MCHC RBC AUTO-ENTMCNC: 31.9 G/DL (ref 31–37)
MCV RBC AUTO: 95.1 FL (ref 78–100)
MONOCYTES # BLD: 0.4 K/UL (ref 0.05–1.2)
MONOCYTES NFR BLD: 9 % (ref 3–10)
NEUTS SEG # BLD: 2.4 K/UL (ref 1.8–8)
NEUTS SEG NFR BLD: 53 % (ref 40–73)
NRBC # BLD: 0 K/UL (ref 0–0.01)
NRBC BLD-RTO: 0 PER 100 WBC
PLATELET # BLD AUTO: 230 K/UL (ref 135–420)
PMV BLD AUTO: 10.7 FL (ref 9.2–11.8)
POTASSIUM SERPL-SCNC: 4.7 MMOL/L (ref 3.5–5.5)
PROT SERPL-MCNC: 7.1 G/DL (ref 6.4–8.2)
PSA SERPL-MCNC: 1.9 NG/ML (ref 0–4)
RBC # BLD AUTO: 4.49 M/UL (ref 4.35–5.65)
SODIUM SERPL-SCNC: 141 MMOL/L (ref 136–145)
TRIGL SERPL-MCNC: 66 MG/DL (ref ?–150)
TSH SERPL DL<=0.05 MIU/L-ACNC: 0.76 UIU/ML (ref 0.36–3.74)
VIT B12 SERPL-MCNC: 155 PG/ML (ref 211–911)
VLDLC SERPL CALC-MCNC: 13.2 MG/DL
WBC # BLD AUTO: 4.5 K/UL (ref 4.6–13.2)

## 2023-01-20 PROCEDURE — 86803 HEPATITIS C AB TEST: CPT

## 2023-01-20 PROCEDURE — 83036 HEMOGLOBIN GLYCOSYLATED A1C: CPT

## 2023-01-20 PROCEDURE — 80061 LIPID PANEL: CPT

## 2023-01-20 PROCEDURE — 84153 ASSAY OF PSA TOTAL: CPT

## 2023-01-20 PROCEDURE — 80053 COMPREHEN METABOLIC PANEL: CPT

## 2023-01-20 PROCEDURE — 85025 COMPLETE CBC W/AUTO DIFF WBC: CPT

## 2023-01-20 PROCEDURE — 36415 COLL VENOUS BLD VENIPUNCTURE: CPT

## 2023-01-20 PROCEDURE — 84443 ASSAY THYROID STIM HORMONE: CPT

## 2023-01-20 PROCEDURE — 82607 VITAMIN B-12: CPT

## 2023-01-23 LAB
HCV AB SER IA-ACNC: <0.02 INDEX
HCV AB SERPL QL IA: NEGATIVE
HCV COMMENT,HCGAC: NORMAL

## 2023-02-14 ENCOUNTER — TELEPHONE (OUTPATIENT)
Facility: CLINIC | Age: 68
End: 2023-02-14

## 2023-02-14 DIAGNOSIS — E78.2 MIXED HYPERLIPIDEMIA: ICD-10-CM

## 2023-02-14 DIAGNOSIS — E53.8 B12 DEFICIENCY: Primary | ICD-10-CM

## 2023-02-14 RX ORDER — ATORVASTATIN CALCIUM 20 MG/1
20 TABLET, FILM COATED ORAL DAILY
Qty: 90 TABLET | Refills: 1 | Status: SHIPPED | OUTPATIENT
Start: 2023-02-14

## 2023-02-14 RX ORDER — CHOLECALCIFEROL (VITAMIN D3) 125 MCG
500 CAPSULE ORAL EVERY OTHER DAY
Qty: 45 TABLET | Refills: 1 | Status: SHIPPED | OUTPATIENT
Start: 2023-02-14 | End: 2023-08-13

## 2023-02-14 NOTE — TELEPHONE ENCOUNTER
Patient calls to hear from regarding lab results from 01/20/2023 He says that he saw them on MyChart but doesn't understand them.

## 2023-02-14 NOTE — TELEPHONE ENCOUNTER
Spoke with patient via phone call on 2/14/2023 of laboratory results. Prescribed Atorvastatin 20 mg tablet daily and Cyanocobalamin 500 mcg tablet every other day. Patient understood had no further questions at this time.

## 2023-10-24 ENCOUNTER — HOSPITAL ENCOUNTER (OUTPATIENT)
Age: 68
Discharge: HOME | End: 2023-10-27
Payer: MEDICARE

## 2023-10-24 ENCOUNTER — OFFICE VISIT (OUTPATIENT)
Facility: CLINIC | Age: 68
End: 2023-10-24
Payer: MEDICARE

## 2023-10-24 VITALS
HEIGHT: 71 IN | SYSTOLIC BLOOD PRESSURE: 121 MMHG | OXYGEN SATURATION: 99 % | HEART RATE: 64 BPM | WEIGHT: 190 LBS | TEMPERATURE: 98 F | RESPIRATION RATE: 18 BRPM | BODY MASS INDEX: 26.6 KG/M2 | DIASTOLIC BLOOD PRESSURE: 79 MMHG

## 2023-10-24 DIAGNOSIS — E78.2 MIXED HYPERLIPIDEMIA: ICD-10-CM

## 2023-10-24 DIAGNOSIS — G50.0 TRIGEMINAL NEURALGIA: ICD-10-CM

## 2023-10-24 DIAGNOSIS — M54.50 CHRONIC MIDLINE LOW BACK PAIN, UNSPECIFIED WHETHER SCIATICA PRESENT: ICD-10-CM

## 2023-10-24 DIAGNOSIS — Z12.5 ENCOUNTER FOR SCREENING FOR MALIGNANT NEOPLASM OF PROSTATE: ICD-10-CM

## 2023-10-24 DIAGNOSIS — R68.89 OTHER GENERAL SYMPTOMS AND SIGNS: ICD-10-CM

## 2023-10-24 DIAGNOSIS — R79.9 ABNORMAL FINDING OF BLOOD CHEMISTRY, UNSPECIFIED: ICD-10-CM

## 2023-10-24 DIAGNOSIS — M47.816 SPONDYLOSIS WITHOUT MYELOPATHY OR RADICULOPATHY, LUMBAR REGION: Primary | ICD-10-CM

## 2023-10-24 DIAGNOSIS — Z77.011 HISTORY OF LEAD EXPOSURE: ICD-10-CM

## 2023-10-24 DIAGNOSIS — G89.29 CHRONIC MIDLINE LOW BACK PAIN, UNSPECIFIED WHETHER SCIATICA PRESENT: ICD-10-CM

## 2023-10-24 LAB
ALBUMIN SERPL-MCNC: 3.6 G/DL (ref 3.4–5)
ALBUMIN/GLOB SERPL: 1.1 (ref 0.8–1.7)
ALP SERPL-CCNC: 53 U/L (ref 45–117)
ALT SERPL-CCNC: 39 U/L (ref 16–61)
ANION GAP SERPL CALC-SCNC: 3 MMOL/L (ref 3–18)
AST SERPL W P-5'-P-CCNC: 24 U/L (ref 10–38)
BASOPHILS # BLD: 0 K/UL (ref 0–0.1)
BASOPHILS NFR BLD: 1 % (ref 0–2)
BILIRUB SERPL-MCNC: 0.8 MG/DL (ref 0.2–1)
BUN SERPL-MCNC: 15 MG/DL (ref 7–18)
BUN/CREAT SERPL: 17 (ref 12–20)
CA-I BLD-MCNC: 9 MG/DL (ref 8.5–10.1)
CHLORIDE SERPL-SCNC: 107 MMOL/L (ref 100–111)
CHOLEST SERPL-MCNC: 186 MG/DL
CO2 SERPL-SCNC: 29 MMOL/L (ref 21–32)
CREAT SERPL-MCNC: 0.88 MG/DL (ref 0.6–1.3)
DIFFERENTIAL METHOD BLD: ABNORMAL
EOSINOPHIL # BLD: 0 K/UL (ref 0–0.4)
EOSINOPHIL NFR BLD: 1 % (ref 0–5)
ERYTHROCYTE [DISTWIDTH] IN BLOOD BY AUTOMATED COUNT: 12.7 % (ref 11.6–14.5)
EST. AVERAGE GLUCOSE BLD GHB EST-MCNC: 105 MG/DL
GLOBULIN SER CALC-MCNC: 3.4 G/DL (ref 2–4)
GLUCOSE SERPL-MCNC: 96 MG/DL (ref 74–99)
HBA1C MFR BLD: 5.3 % (ref 4.2–5.6)
HCT VFR BLD AUTO: 43.6 % (ref 36–48)
HDLC SERPL-MCNC: 85 MG/DL (ref 40–60)
HDLC SERPL: 2.2 (ref 0–5)
HGB BLD-MCNC: 13.9 G/DL (ref 13–16)
IMM GRANULOCYTES # BLD AUTO: 0 K/UL (ref 0–0.04)
IMM GRANULOCYTES NFR BLD AUTO: 1 % (ref 0–0.5)
LDLC SERPL CALC-MCNC: 89 MG/DL (ref 0–100)
LIPID PANEL: ABNORMAL
LYMPHOCYTES # BLD: 1.6 K/UL (ref 0.9–3.6)
LYMPHOCYTES NFR BLD: 29 % (ref 21–52)
MCH RBC QN AUTO: 30.8 PG (ref 24–34)
MCHC RBC AUTO-ENTMCNC: 31.9 G/DL (ref 31–37)
MCV RBC AUTO: 96.5 FL (ref 78–100)
MONOCYTES # BLD: 0.5 K/UL (ref 0.05–1.2)
MONOCYTES NFR BLD: 9 % (ref 3–10)
NEUTS SEG # BLD: 3.3 K/UL (ref 1.8–8)
NEUTS SEG NFR BLD: 59 % (ref 40–73)
NRBC # BLD: 0 K/UL (ref 0–0.01)
NRBC BLD-RTO: 0 PER 100 WBC
PLATELET # BLD AUTO: 243 K/UL (ref 135–420)
PMV BLD AUTO: 10.6 FL (ref 9.2–11.8)
POTASSIUM SERPL-SCNC: 4.5 MMOL/L (ref 3.5–5.5)
PROT SERPL-MCNC: 7 G/DL (ref 6.4–8.2)
PSA SERPL-MCNC: 2.7 NG/ML (ref 0–4)
RBC # BLD AUTO: 4.52 M/UL (ref 4.35–5.65)
SODIUM SERPL-SCNC: 139 MMOL/L (ref 136–145)
TRIGL SERPL-MCNC: 60 MG/DL
TSH SERPL DL<=0.05 MIU/L-ACNC: 0.77 UIU/ML (ref 0.36–3.74)
VIT B12 SERPL-MCNC: 359 PG/ML (ref 211–911)
VLDLC SERPL CALC-MCNC: 12 MG/DL
WBC # BLD AUTO: 5.6 K/UL (ref 4.6–13.2)

## 2023-10-24 PROCEDURE — 83036 HEMOGLOBIN GLYCOSYLATED A1C: CPT

## 2023-10-24 PROCEDURE — 82607 VITAMIN B-12: CPT

## 2023-10-24 PROCEDURE — 84443 ASSAY THYROID STIM HORMONE: CPT

## 2023-10-24 PROCEDURE — 1036F TOBACCO NON-USER: CPT | Performed by: NURSE PRACTITIONER

## 2023-10-24 PROCEDURE — G8419 CALC BMI OUT NRM PARAM NOF/U: HCPCS | Performed by: NURSE PRACTITIONER

## 2023-10-24 PROCEDURE — 36415 COLL VENOUS BLD VENIPUNCTURE: CPT

## 2023-10-24 PROCEDURE — 3017F COLORECTAL CA SCREEN DOC REV: CPT | Performed by: NURSE PRACTITIONER

## 2023-10-24 PROCEDURE — G8484 FLU IMMUNIZE NO ADMIN: HCPCS | Performed by: NURSE PRACTITIONER

## 2023-10-24 PROCEDURE — G8428 CUR MEDS NOT DOCUMENT: HCPCS | Performed by: NURSE PRACTITIONER

## 2023-10-24 PROCEDURE — 80061 LIPID PANEL: CPT

## 2023-10-24 PROCEDURE — 99214 OFFICE O/P EST MOD 30 MIN: CPT | Performed by: NURSE PRACTITIONER

## 2023-10-24 PROCEDURE — 84153 ASSAY OF PSA TOTAL: CPT

## 2023-10-24 PROCEDURE — 85025 COMPLETE CBC W/AUTO DIFF WBC: CPT

## 2023-10-24 PROCEDURE — 80053 COMPREHEN METABOLIC PANEL: CPT

## 2023-10-24 PROCEDURE — 83655 ASSAY OF LEAD: CPT

## 2023-10-24 PROCEDURE — 1123F ACP DISCUSS/DSCN MKR DOCD: CPT | Performed by: NURSE PRACTITIONER

## 2023-10-24 NOTE — PROGRESS NOTES
History of Present Illness  Olimpia Kaplan is a 79 y.o. male who presents today for:    Chief Complaint   Patient presents with    Follow-up     9m f/u  Pt reports hip and elbow pain/aches, wondering if its possibly coming from atorvastatin       Past Medical History  Past Medical History:   Diagnosis Date    Acute sinusitis 7/22/2020    Allergic rhinitis 7/22/2020    Arthritis     Cancer ([address])     skin    Contusion of chest 7/22/2020    Erectile dysfunction 7/22/2020    Hyperlipidemia 7/22/2020    Sleep apnea     does have to use cpap    Trigeminal neuralgia 7/22/2020        Surgical History  Past Surgical History:   Procedure Laterality Date    ANTERIOR CRUCIATE LIGAMENT REPAIR  2000    right knee     HEENT      sinus surgery     HEENT  1996    UPPP    HERNIA REPAIR      umbilical    ORTHOPEDIC SURGERY      right elbow tendon repair     [address]    right ankle     ORTHOPEDIC SURGERY      right shoulder x 2     ORTHOPEDIC SURGERY  2017    right ankle     ORTHOPEDIC SURGERY  09/1964    left leg tendon lengthing     ORTHOPEDIC SURGERY      left shoulder rotator cuff x 2     TONSILLECTOMY  1996        Current Medications  Current Outpatient Medications   Medication Sig    Misc Natural Products (OSTEO BI-FLEX ADV JOINT SHIELD PO) Take by mouth    Fexofenadine HCl (ALLERGY 24-HR PO) Take by mouth    Coenzyme Q10 (CO Q 10 PO) Take by mouth    ascorbic acid (VITAMIN C) 500 MG tablet Take 1 tablet by mouth daily    atorvastatin (LIPITOR) 20 MG tablet Take 1 tablet by mouth daily    azelastine HCl 0.15 % SOLN 2 times daily    carBAMazepine (TEGRETOL XR) 100 MG extended release tablet Take 1 tablet by mouth 2 times daily    famciclovir (FAMVIR) 500 MG tablet TAKE 1 TABLET BY MOUTH THREE TIMES DAILY    fluticasone (FLONASE) 50 MCG/ACT nasal spray 2 sprays in each nostril daily  Indications: inflammation of the nose due to an allergy    loperamide (IMODIUM A-D) 2 MG tablet Take 2 tablets by mouth daily

## 2023-10-24 NOTE — PROGRESS NOTES
Mariza Maldonado presents today for   Chief Complaint   Patient presents with    Follow-up     9m f/u         Is someone accompanying this pt? no    Is the patient using any DME equipment during OV? no    Health Maintenance reviewed and discussed and ordered per Provider. Health Maintenance Due   Topic Date Due    AAA screen  Never done    Pneumococcal 65+ years Vaccine (3 - PPSV23 or PCV20) 02/24/2022    COVID-19 Vaccine (5 - Moderna series) 02/17/2023    Flu vaccine (1) 08/01/2023   . Coordination of Care:  1. \"Have you been to the ER, urgent care clinic since your last visit? Hospitalized since your last visit? \" No    2. \"Have you seen or consulted any other health care providers outside of the [address] [place] [address] since your last visit? \" No    3. For patients aged 43-73: Has the patient had a colonoscopy? Yes- No care gap present    If the patient is female:    4. For patients aged 43-66: Has the patient had a mammogram within the past 2 years? N/A based on age/sex    5. For patients aged 21-65: Has the patient had a pap smear?  N/A based on age/sex

## 2023-10-26 LAB
HISPANIC?: ABNORMAL
LEAD BLD-MCNC: 5.6 UG/DL (ref 0–3.4)
RACE: ABNORMAL
SPECIMEN SOURCE: ABNORMAL
TEST PURPOSE: ABNORMAL

## 2023-11-03 ENCOUNTER — TELEPHONE (OUTPATIENT)
Facility: CLINIC | Age: 68
End: 2023-11-03

## 2023-11-03 NOTE — TELEPHONE ENCOUNTER
Attempted to reach patient via phone call on 11/3/2023 to discuss laboratory results. Patient did not answer and will attempt again at later time.

## 2023-12-13 ENCOUNTER — TELEPHONE (OUTPATIENT)
Facility: CLINIC | Age: 68
End: 2023-12-13

## 2024-03-22 ENCOUNTER — OFFICE VISIT (OUTPATIENT)
Facility: CLINIC | Age: 69
End: 2024-03-22
Payer: MEDICARE

## 2024-03-22 VITALS
BODY MASS INDEX: 27.3 KG/M2 | OXYGEN SATURATION: 99 % | WEIGHT: 195 LBS | SYSTOLIC BLOOD PRESSURE: 111 MMHG | DIASTOLIC BLOOD PRESSURE: 71 MMHG | RESPIRATION RATE: 18 BRPM | HEART RATE: 67 BPM | HEIGHT: 71 IN | TEMPERATURE: 97.8 F

## 2024-03-22 DIAGNOSIS — I83.91 VARICOSE VEINS OF RIGHT LOWER EXTREMITY, UNSPECIFIED WHETHER COMPLICATED: Primary | ICD-10-CM

## 2024-03-22 DIAGNOSIS — M79.89 RIGHT LEG SWELLING: ICD-10-CM

## 2024-03-22 DIAGNOSIS — M53.3 SACROILIAC JOINT PAIN: ICD-10-CM

## 2024-03-22 DIAGNOSIS — R78.71 ELEVATED BLOOD LEAD LEVEL: ICD-10-CM

## 2024-03-22 PROCEDURE — 1036F TOBACCO NON-USER: CPT | Performed by: STUDENT IN AN ORGANIZED HEALTH CARE EDUCATION/TRAINING PROGRAM

## 2024-03-22 PROCEDURE — G8484 FLU IMMUNIZE NO ADMIN: HCPCS | Performed by: STUDENT IN AN ORGANIZED HEALTH CARE EDUCATION/TRAINING PROGRAM

## 2024-03-22 PROCEDURE — 3017F COLORECTAL CA SCREEN DOC REV: CPT | Performed by: STUDENT IN AN ORGANIZED HEALTH CARE EDUCATION/TRAINING PROGRAM

## 2024-03-22 PROCEDURE — 99214 OFFICE O/P EST MOD 30 MIN: CPT | Performed by: STUDENT IN AN ORGANIZED HEALTH CARE EDUCATION/TRAINING PROGRAM

## 2024-03-22 PROCEDURE — 1123F ACP DISCUSS/DSCN MKR DOCD: CPT | Performed by: STUDENT IN AN ORGANIZED HEALTH CARE EDUCATION/TRAINING PROGRAM

## 2024-03-22 PROCEDURE — G8428 CUR MEDS NOT DOCUMENT: HCPCS | Performed by: STUDENT IN AN ORGANIZED HEALTH CARE EDUCATION/TRAINING PROGRAM

## 2024-03-22 PROCEDURE — G8419 CALC BMI OUT NRM PARAM NOF/U: HCPCS | Performed by: STUDENT IN AN ORGANIZED HEALTH CARE EDUCATION/TRAINING PROGRAM

## 2024-03-22 SDOH — ECONOMIC STABILITY: FOOD INSECURITY: WITHIN THE PAST 12 MONTHS, THE FOOD YOU BOUGHT JUST DIDN'T LAST AND YOU DIDN'T HAVE MONEY TO GET MORE.: NEVER TRUE

## 2024-03-22 SDOH — ECONOMIC STABILITY: FOOD INSECURITY: WITHIN THE PAST 12 MONTHS, YOU WORRIED THAT YOUR FOOD WOULD RUN OUT BEFORE YOU GOT MONEY TO BUY MORE.: NEVER TRUE

## 2024-03-22 SDOH — ECONOMIC STABILITY: HOUSING INSECURITY
IN THE LAST 12 MONTHS, WAS THERE A TIME WHEN YOU DID NOT HAVE A STEADY PLACE TO SLEEP OR SLEPT IN A SHELTER (INCLUDING NOW)?: NO

## 2024-03-22 SDOH — ECONOMIC STABILITY: INCOME INSECURITY: HOW HARD IS IT FOR YOU TO PAY FOR THE VERY BASICS LIKE FOOD, HOUSING, MEDICAL CARE, AND HEATING?: NOT HARD AT ALL

## 2024-03-22 ASSESSMENT — PATIENT HEALTH QUESTIONNAIRE - PHQ9
SUM OF ALL RESPONSES TO PHQ QUESTIONS 1-9: 0
SUM OF ALL RESPONSES TO PHQ QUESTIONS 1-9: 0
1. LITTLE INTEREST OR PLEASURE IN DOING THINGS: NOT AT ALL
SUM OF ALL RESPONSES TO PHQ9 QUESTIONS 1 & 2: 0
SUM OF ALL RESPONSES TO PHQ QUESTIONS 1-9: 0
2. FEELING DOWN, DEPRESSED OR HOPELESS: NOT AT ALL
SUM OF ALL RESPONSES TO PHQ QUESTIONS 1-9: 0

## 2024-03-22 NOTE — PROGRESS NOTES
Kojo Roth presents today for   Chief Complaint   Patient presents with    Leg Swelling     Patient reports having bilateral calf swelling and redness that's been off and on for a while       Is someone accompanying this pt? no    Is the patient using any DME equipment during OV? no    Health Maintenance reviewed and discussed and ordered per Provider.    Health Maintenance Due   Topic Date Due    Respiratory Syncytial Virus (RSV) Pregnant or age 60 yrs+ (1 - 1-dose 60+ series) Never done    AAA screen  Never done    Pneumococcal 65+ years Vaccine (3 of 3 - PPSV23 or PCV20) 02/24/2022    Flu vaccine (1) 08/01/2023    COVID-19 Vaccine (5 - 2023-24 season) 09/01/2023    Depression Screen  01/18/2024    Annual Wellness Visit (Medicare)  01/19/2024   .      \"Have you been to the ER, urgent care clinic since your last visit?  Hospitalized since your last visit?\"    NO    “Have you seen or consulted any other health care providers outside of [facility] since your last visit?”    NO

## 2024-03-22 NOTE — PROGRESS NOTES
SICK VISIT     Kojo Roth (: 1955) is a 68 y.o. male here for evaluation of the following chief concerns(s):  Leg Swelling (Patient reports having bilateral calf swelling and redness that's been off and on for a while)       ASSESSMENT/PLAN:  1. Varicose veins of right lower extremity, unspecified whether complicated  -     [facility], [place] ([facility])  2. Right leg swelling  -     [facility] [place] ([facility])  3. Elevated blood lead level  4. Sacroiliac joint pain    Orders Placed This Encounter   Procedures    [facility] [place] ([facility])     Referral Priority:   Routine     Referral Type:   Eval and Treat     Referral Reason:   Specialty Services Required     Requested Specialty:   Vascular Surgery     Number of Visits Requested:   1     R leg swelling, varicose vein- compression stockings, elevation. Refer to vascular     Xerosis- recommend moisturizing cream    SI joint pain- continue Meloxicam. Consider ortho referral     Elevated blood lead level- mild- per UTD, potential effects of long term exposure is hypertension and kidney dysfunction, increase risk of essential tremor. Would try to minimize exposure    FU as scheduled for chronic disease    Patient agrees with plan as above and has no additional questions at this time.     SUBJECTIVE/OBJECTIVE:    Patient presents for leg swelling. Both legs swell on and off. Has been going on for some time. No history of heart issues. R leg always more swollen than left- left leg has essentially been totally reconstructed. Patient also notes a red itchy rash on legs associated with the swelling. No open areas or ulcers     He notes pain at a specific area in his buttocks. Hurts mostly when he rolls in the bed. No radiation. No difficulty walking.     Lastly- patient teaches a firearms course at the [employer]

## 2024-04-22 DIAGNOSIS — E78.2 MIXED HYPERLIPIDEMIA: ICD-10-CM

## 2024-04-22 RX ORDER — ATORVASTATIN CALCIUM 20 MG/1
20 TABLET, FILM COATED ORAL DAILY
Qty: 90 TABLET | Refills: 1 | Status: SHIPPED | OUTPATIENT
Start: 2024-04-22

## 2024-06-26 ENCOUNTER — TELEPHONE (OUTPATIENT)
Facility: CLINIC | Age: 69
End: 2024-06-26

## 2024-06-26 ENCOUNTER — OFFICE VISIT (OUTPATIENT)
Facility: CLINIC | Age: 69
End: 2024-06-26
Payer: MEDICARE

## 2024-06-26 VITALS
HEART RATE: 99 BPM | TEMPERATURE: 97.8 F | RESPIRATION RATE: 18 BRPM | OXYGEN SATURATION: 96 % | DIASTOLIC BLOOD PRESSURE: 81 MMHG | WEIGHT: 187.4 LBS | BODY MASS INDEX: 26.23 KG/M2 | HEIGHT: 71 IN | SYSTOLIC BLOOD PRESSURE: 130 MMHG

## 2024-06-26 DIAGNOSIS — E78.2 MIXED HYPERLIPIDEMIA: ICD-10-CM

## 2024-06-26 DIAGNOSIS — J01.90 ACUTE NON-RECURRENT SINUSITIS, UNSPECIFIED LOCATION: Primary | ICD-10-CM

## 2024-06-26 DIAGNOSIS — R42 DIZZINESS: ICD-10-CM

## 2024-06-26 PROCEDURE — G8427 DOCREV CUR MEDS BY ELIG CLIN: HCPCS | Performed by: NURSE PRACTITIONER

## 2024-06-26 PROCEDURE — G8419 CALC BMI OUT NRM PARAM NOF/U: HCPCS | Performed by: NURSE PRACTITIONER

## 2024-06-26 PROCEDURE — 1036F TOBACCO NON-USER: CPT | Performed by: NURSE PRACTITIONER

## 2024-06-26 PROCEDURE — 99214 OFFICE O/P EST MOD 30 MIN: CPT | Performed by: NURSE PRACTITIONER

## 2024-06-26 PROCEDURE — 3017F COLORECTAL CA SCREEN DOC REV: CPT | Performed by: NURSE PRACTITIONER

## 2024-06-26 PROCEDURE — 1123F ACP DISCUSS/DSCN MKR DOCD: CPT | Performed by: NURSE PRACTITIONER

## 2024-06-26 RX ORDER — CELECOXIB 200 MG/1
200 CAPSULE ORAL DAILY
COMMUNITY
Start: 2024-06-21

## 2024-06-26 RX ORDER — AMOXICILLIN 500 MG/1
500 CAPSULE ORAL 2 TIMES DAILY
Qty: 14 CAPSULE | Refills: 0 | Status: SHIPPED | OUTPATIENT
Start: 2024-06-26 | End: 2024-07-03

## 2024-06-26 NOTE — PROGRESS NOTES
History of Present Illness  Kojo Roth is a 68 y.o. male who presents today for:    Chief Complaint   Patient presents with    Dizziness     Since Tuesday     Headache     Since this morning        Past Medical History  Past Medical History:   Diagnosis Date    Acute sinusitis 7/22/2020    Allergic rhinitis 7/22/2020    Arthritis     Cancer (H[facility])     skin    Contusion of chest 7/22/2020    Erectile dysfunction 7/22/2020    Hyperlipidemia 7/22/2020    Sleep apnea     does have to use cpap    Trigeminal neuralgia 7/22/2020        Surgical History  Past Surgical History:   Procedure Laterality Date    ANTERIOR CRUCIATE LIGAMENT REPAIR  2000    right knee     HEENT      sinus surgery     HEENT  1996    UPPP    HERNIA REPAIR      umbilical    ORTHOPEDIC SURGERY      right elbow tendon repair     ORTHOPEDIC SURGERY  1979    right ankle     ORTHOPEDIC SURGERY      right shoulder x 2     ORTHOPEDIC SURGERY  2017    right ankle     ORTHOPEDIC SURGERY  09/1964    left leg tendon lengthing     ORTHOPEDIC SURGERY      left shoulder rotator cuff x 2     TONSILLECTOMY  1996        Current Medications  Current Outpatient Medications   Medication Sig    celecoxib (CELEBREX) 200 MG capsule Take 1 capsule by mouth daily    atorvastatin (LIPITOR) 20 MG tablet Take 1 tablet by mouth once daily    Misc Natural Products (OSTEO BI-FLEX ADV JOINT SHIELD PO) Take by mouth    Fexofenadine HCl (ALLERGY 24-HR PO) Take by mouth    Coenzyme Q10 (CO Q 10 PO) Take by mouth    ascorbic acid (VITAMIN C) 500 MG tablet Take 1 tablet by mouth daily    azelastine HCl 0.15 % SOLN 2 times daily    carBAMazepine (TEGRETOL XR) 100 MG extended release tablet Take 1 tablet by mouth 2 times daily    famciclovir (FAMVIR) 500 MG tablet TAKE 1 TABLET BY MOUTH THREE TIMES DAILY    fluticasone (FLONASE) 50 MCG/ACT nasal spray 2 sprays in each nostril daily  Indications: inflammation of the nose due to an allergy    loperamide (IMODIUM A-D) 2 MG tablet Take 2

## 2024-06-26 NOTE — PROGRESS NOTES
Patient is concerned that he may have fluid in both ears.     Chief Complaint   Patient presents with    Dizziness     Since Tuesday     Headache     Since this morning        \"Have you been to the ER, urgent care clinic since your last visit?  Hospitalized since your last visit?\"    NO    “Have you seen or consulted any other health care providers outside of [facility] since your last visit?”    Tiago Escobar

## 2024-06-26 NOTE — TELEPHONE ENCOUNTER
Spoke with patient via phone call on 6/26/2024.  Patient reports dizziness and imbalance since receiving a steroid injection on 6/21/2024.  He reports he was also prescribed Celebrex 200 mg capsule daily which he initiated on 6/22/2024.  He reports he is unable to check his blood pressure at home.  Patient reports he did not take his prescribed Celebrex 200 mg capsule this morning, but did take medication yesterday.  Advised patient to increase his H2O intake and he will be put on the schedule at 3:30 PM today, 6/26/2024 for evaluation by this provider.  Patient was advised to report to ED if his symptoms worsen prior to his appointment later today.

## 2024-11-04 DIAGNOSIS — E78.2 MIXED HYPERLIPIDEMIA: ICD-10-CM

## 2024-11-04 RX ORDER — ATORVASTATIN CALCIUM 20 MG/1
20 TABLET, FILM COATED ORAL DAILY
Qty: 90 TABLET | Refills: 0 | Status: SHIPPED | OUTPATIENT
Start: 2024-11-04

## 2025-01-10 ENCOUNTER — HOSPITAL ENCOUNTER (EMERGENCY)
Age: 70
Discharge: HOME | End: 2025-01-10
Attending: EMERGENCY MEDICINE
Payer: MEDICARE

## 2025-01-10 ENCOUNTER — APPOINTMENT (OUTPATIENT)
Age: 70
End: 2025-01-10
Payer: MEDICARE

## 2025-01-10 VITALS
SYSTOLIC BLOOD PRESSURE: 119 MMHG | DIASTOLIC BLOOD PRESSURE: 66 MMHG | OXYGEN SATURATION: 97 % | TEMPERATURE: 98.9 F | RESPIRATION RATE: 16 BRPM | HEART RATE: 84 BPM | BODY MASS INDEX: 25.1 KG/M2 | WEIGHT: 180 LBS

## 2025-01-10 DIAGNOSIS — R55 VASOVAGAL SYNCOPE: ICD-10-CM

## 2025-01-10 DIAGNOSIS — J40 BRONCHITIS: Primary | ICD-10-CM

## 2025-01-10 PROCEDURE — 71046 X-RAY EXAM CHEST 2 VIEWS: CPT

## 2025-01-10 PROCEDURE — 99284 EMERGENCY DEPT VISIT MOD MDM: CPT

## 2025-01-10 RX ORDER — CODEINE PHOSPHATE AND GUAIFENESIN 10; 100 MG/5ML; MG/5ML
5 SOLUTION ORAL 3 TIMES DAILY PRN
Qty: 100 ML | Refills: 0 | Status: SHIPPED | OUTPATIENT
Start: 2025-01-10 | End: 2025-01-17

## 2025-01-10 RX ORDER — GUAIFENESIN AND DEXTROMETHORPHAN HYDROBROMIDE 600; 30 MG/1; MG/1
2 TABLET, EXTENDED RELEASE ORAL EVERY 12 HOURS PRN
Qty: 50 TABLET | Refills: 1 | Status: SHIPPED | OUTPATIENT
Start: 2025-01-10

## 2025-01-10 RX ORDER — CODEINE PHOSPHATE AND GUAIFENESIN 10; 100 MG/5ML; MG/5ML
5 SOLUTION ORAL
Status: DISCONTINUED | OUTPATIENT
Start: 2025-01-10 | End: 2025-01-10 | Stop reason: HOSPADM

## 2025-01-10 ASSESSMENT — LIFESTYLE VARIABLES
HOW OFTEN DO YOU HAVE A DRINK CONTAINING ALCOHOL: NEVER
HOW MANY STANDARD DRINKS CONTAINING ALCOHOL DO YOU HAVE ON A TYPICAL DAY: PATIENT DOES NOT DRINK

## 2025-01-11 NOTE — ED PROVIDER NOTES
capsule  Commonly known as: CELEBREX     CO Q 10 PO     EPINEPHrine 0.3 MG/0.3ML Soaj injection  Commonly known as: EPIPEN     famciclovir 500 MG tablet  Commonly known as: FAMVIR     fluticasone 50 MCG/ACT nasal spray  Commonly known as: FLONASE     L-Lysine 500 MG Tabs     loperamide 2 MG tablet  Commonly known as: IMODIUM A-D     meloxicam 15 MG tablet  Commonly known as: MOBIC     OSTEO BI-FLEX ADV JOINT SHIELD PO     sildenafil 100 MG tablet  Commonly known as: VIAGRA     vitamin B-12 500 MCG tablet  Commonly known as: CYANOCOBALAMIN  Take 1 tablet by mouth every other day               Where to Get Your Medications        These medications were sent to [facility] [address] [place] [address] -  [phone] -  [phone]  [address] [place] [id number]      Phone: [phone]   guaiFENesin-codeine 100-10 MG/5ML liquid  Mucinex DM  MG per extended release tablet       3. [unfilled]  _______________________________    This note was partially transcribed via voice recognition software. Although efforts have been made to catch any discrepancies, it may contain sound alike words, grammatical errors, or nonsensical words.             Alcon Carter MD  01/10/25 [id number]

## 2025-01-11 NOTE — ED TRIAGE NOTES
Pt states dx with sinus infx on 12/16, and has had 2 rounds of antibiotics. PT wife states he was coughing last night and tonight and his eyes rolled into the back of his head and he passed out. Pt has no complaints or concerns at time of triage, no acute distress noted.

## 2025-01-12 LAB
EKG ATRIAL RATE: 81 BPM
EKG DIAGNOSIS: NORMAL
EKG P AXIS: 58 DEGREES
EKG P-R INTERVAL: 162 MS
EKG Q-T INTERVAL: 332 MS
EKG QRS DURATION: 72 MS
EKG QTC CALCULATION (BAZETT): 385 MS
EKG R AXIS: 1 DEGREES
EKG T AXIS: 48 DEGREES
EKG VENTRICULAR RATE: 81 BPM

## (undated) DEVICE — PACK PROCEDURE SURG EXTREMITY CUST

## (undated) DEVICE — REM POLYHESIVE ADULT PATIENT RETURN ELECTRODE: Brand: VALLEYLAB

## (undated) DEVICE — TOWEL,OR,DSP,ST,BLUE,STD,4/PK,20PK/CS: Brand: MEDLINE

## (undated) DEVICE — SUTURE MCRYL SZ 3-0 L27IN ABSRB UD L26MM SH 1/2 CIR Y416H

## (undated) DEVICE — DRAPE EQUIP CARM MINI STRL

## (undated) DEVICE — SUT VCRL + 2-0 27IN CT2 UD -- 36/BX

## (undated) DEVICE — SYR 10ML LUER LOK 1/5ML GRAD --

## (undated) DEVICE — SUT ETHLN 3-0 18IN PS1 BLK --

## (undated) DEVICE — BNDG SOF-FORM 2X75 STRL LF --

## (undated) DEVICE — PREP CHLORAPREP 10.5 ML ORG --

## (undated) DEVICE — STERILE POLYISOPRENE POWDER-FREE SURGICAL GLOVES: Brand: PROTEXIS

## (undated) DEVICE — DRAPE XR C ARM 41X74IN LF --

## (undated) DEVICE — SHEET,DRAPE,40X58,STERILE: Brand: MEDLINE

## (undated) DEVICE — BANDAGE COMPR W4INXL5YD ELAS CLP CLSR

## (undated) DEVICE — SUTURE ETHLN SZ 4-0 L18IN NONABSORBABLE BLK L19MM PS-2 3/8 1667H

## (undated) DEVICE — SUT MONOCRYL PLUS UD 4-0 --

## (undated) DEVICE — ZIMMER® STERILE DISPOSABLE TOURNIQUET CUFF WITH PLC, DUAL PORT, SINGLE BLADDER, 34 IN. (86 CM)

## (undated) DEVICE — 3-0 COATED VICRYL PLUS UNDYED 1X27" SH --

## (undated) DEVICE — SUTURE VCRL SZ 4-0 L18IN ABSRB UD L19MM PS-2 3/8 CIR PRIM J496H

## (undated) DEVICE — SPONGE SURG DISSECTOR 3/8 IN RND PNUT COTTON WHT STRL DISP

## (undated) DEVICE — SYR LR LCK 1ML GRAD NSAF 30ML --

## (undated) DEVICE — GOWN,AURORA,NONRNF,XL,30/CS: Brand: MEDLINE

## (undated) DEVICE — GARMENT,MEDLINE,DVT,INT,CALF,MED, GEN2: Brand: MEDLINE

## (undated) DEVICE — BANDAGE,GAUZE,CONFORMING,4"X75",STRL,LF: Brand: MEDLINE

## (undated) DEVICE — DISPOSABLE TOURNIQUET CUFF SINGLE BLADDER, SINGLE PORT AND QUICK CONNECT CONNECTOR: Brand: COLOR CUFF